# Patient Record
Sex: MALE | Race: BLACK OR AFRICAN AMERICAN | NOT HISPANIC OR LATINO | Employment: OTHER | URBAN - METROPOLITAN AREA
[De-identification: names, ages, dates, MRNs, and addresses within clinical notes are randomized per-mention and may not be internally consistent; named-entity substitution may affect disease eponyms.]

---

## 2017-11-28 ENCOUNTER — HOSPITAL ENCOUNTER (EMERGENCY)
Facility: HOSPITAL | Age: 42
Discharge: HOME | End: 2017-11-28
Attending: EMERGENCY MEDICINE
Payer: OTHER GOVERNMENT

## 2017-11-28 VITALS
HEART RATE: 84 BPM | BODY MASS INDEX: 35.12 KG/M2 | OXYGEN SATURATION: 98 % | RESPIRATION RATE: 20 BRPM | DIASTOLIC BLOOD PRESSURE: 85 MMHG | TEMPERATURE: 100 F | WEIGHT: 265 LBS | HEIGHT: 73 IN | SYSTOLIC BLOOD PRESSURE: 132 MMHG

## 2017-11-28 DIAGNOSIS — R10.9 FLANK PAIN: Primary | ICD-10-CM

## 2017-11-28 DIAGNOSIS — H11.30 SUBCONJUNCTIVAL HEMORRHAGE, UNSPECIFIED LATERALITY: ICD-10-CM

## 2017-11-28 DIAGNOSIS — M54.9 BACK PAIN, UNSPECIFIED BACK LOCATION, UNSPECIFIED BACK PAIN LATERALITY, UNSPECIFIED CHRONICITY: ICD-10-CM

## 2017-11-28 DIAGNOSIS — R10.9 RIGHT FLANK PAIN: ICD-10-CM

## 2017-11-28 LAB
ALBUMIN SERPL BCP-MCNC: 3.7 G/DL
ALP SERPL-CCNC: 77 U/L
ALT SERPL W/O P-5'-P-CCNC: 23 U/L
ANION GAP SERPL CALC-SCNC: 7 MMOL/L
AST SERPL-CCNC: 21 U/L
BASOPHILS # BLD AUTO: 0.02 K/UL
BASOPHILS NFR BLD: 0.3 %
BILIRUB SERPL-MCNC: 0.5 MG/DL
BILIRUB UR QL STRIP: NEGATIVE
BUN SERPL-MCNC: 9 MG/DL
CALCIUM SERPL-MCNC: 8.8 MG/DL
CHLORIDE SERPL-SCNC: 104 MMOL/L
CLARITY UR: CLEAR
CO2 SERPL-SCNC: 28 MMOL/L
COLOR UR: YELLOW
CREAT SERPL-MCNC: 1 MG/DL
DIFFERENTIAL METHOD: ABNORMAL
EOSINOPHIL # BLD AUTO: 0.2 K/UL
EOSINOPHIL NFR BLD: 2.9 %
ERYTHROCYTE [DISTWIDTH] IN BLOOD BY AUTOMATED COUNT: 13.1 %
EST. GFR  (AFRICAN AMERICAN): >60 ML/MIN/1.73 M^2
EST. GFR  (NON AFRICAN AMERICAN): >60 ML/MIN/1.73 M^2
GLUCOSE SERPL-MCNC: 137 MG/DL
GLUCOSE UR QL STRIP: NEGATIVE
HCT VFR BLD AUTO: 40.6 %
HGB BLD-MCNC: 14.5 G/DL
HGB UR QL STRIP: NEGATIVE
KETONES UR QL STRIP: NEGATIVE
LEUKOCYTE ESTERASE UR QL STRIP: NEGATIVE
LIPASE SERPL-CCNC: 47 U/L
LYMPHOCYTES # BLD AUTO: 2.5 K/UL
LYMPHOCYTES NFR BLD: 36.8 %
MCH RBC QN AUTO: 29.5 PG
MCHC RBC AUTO-ENTMCNC: 35.7 G/DL
MCV RBC AUTO: 83 FL
MONOCYTES # BLD AUTO: 0.5 K/UL
MONOCYTES NFR BLD: 6.8 %
NEUTROPHILS # BLD AUTO: 3.7 K/UL
NEUTROPHILS NFR BLD: 53.2 %
NITRITE UR QL STRIP: NEGATIVE
PH UR STRIP: 6 [PH] (ref 5–8)
PLATELET # BLD AUTO: 133 K/UL
PMV BLD AUTO: 10.3 FL
POTASSIUM SERPL-SCNC: 3.7 MMOL/L
PROT SERPL-MCNC: 7.1 G/DL
PROT UR QL STRIP: NEGATIVE
RBC # BLD AUTO: 4.91 M/UL
SODIUM SERPL-SCNC: 139 MMOL/L
SP GR UR STRIP: 1.02 (ref 1–1.03)
URN SPEC COLLECT METH UR: NORMAL
UROBILINOGEN UR STRIP-ACNC: NEGATIVE EU/DL
WBC # BLD AUTO: 6.91 K/UL

## 2017-11-28 PROCEDURE — 96361 HYDRATE IV INFUSION ADD-ON: CPT

## 2017-11-28 PROCEDURE — 80053 COMPREHEN METABOLIC PANEL: CPT

## 2017-11-28 PROCEDURE — 25000003 PHARM REV CODE 250: Performed by: NURSE PRACTITIONER

## 2017-11-28 PROCEDURE — 63600175 PHARM REV CODE 636 W HCPCS: Performed by: NURSE PRACTITIONER

## 2017-11-28 PROCEDURE — 96375 TX/PRO/DX INJ NEW DRUG ADDON: CPT

## 2017-11-28 PROCEDURE — 99284 EMERGENCY DEPT VISIT MOD MDM: CPT | Mod: 25

## 2017-11-28 PROCEDURE — 96374 THER/PROPH/DIAG INJ IV PUSH: CPT

## 2017-11-28 PROCEDURE — 83690 ASSAY OF LIPASE: CPT

## 2017-11-28 PROCEDURE — 85025 COMPLETE CBC W/AUTO DIFF WBC: CPT

## 2017-11-28 PROCEDURE — 81003 URINALYSIS AUTO W/O SCOPE: CPT

## 2017-11-28 RX ORDER — MORPHINE SULFATE 5 MG/ML
10 INJECTION, SOLUTION INTRAMUSCULAR; INTRAVENOUS
Status: COMPLETED | OUTPATIENT
Start: 2017-11-28 | End: 2017-11-28

## 2017-11-28 RX ORDER — KETOROLAC TROMETHAMINE 30 MG/ML
15 INJECTION, SOLUTION INTRAMUSCULAR; INTRAVENOUS
Status: COMPLETED | OUTPATIENT
Start: 2017-11-28 | End: 2017-11-28

## 2017-11-28 RX ORDER — DICYCLOMINE HYDROCHLORIDE 10 MG/1
20 CAPSULE ORAL
Status: COMPLETED | OUTPATIENT
Start: 2017-11-28 | End: 2017-11-28

## 2017-11-28 RX ORDER — METHOCARBAMOL 500 MG/1
1000 TABLET, FILM COATED ORAL 3 TIMES DAILY
Qty: 30 TABLET | Refills: 0 | Status: SHIPPED | OUTPATIENT
Start: 2017-11-28 | End: 2017-12-03

## 2017-11-28 RX ADMIN — KETOROLAC TROMETHAMINE 15 MG: 30 INJECTION, SOLUTION INTRAMUSCULAR at 02:11

## 2017-11-28 RX ADMIN — SODIUM CHLORIDE 1000 ML: 0.9 INJECTION, SOLUTION INTRAVENOUS at 02:11

## 2017-11-28 RX ADMIN — MORPHINE SULFATE 10 MG: 5 INJECTION, SOLUTION INTRAMUSCULAR; INTRAVENOUS at 03:11

## 2017-11-28 RX ADMIN — DICYCLOMINE HYDROCHLORIDE 20 MG: 10 CAPSULE ORAL at 03:11

## 2017-11-28 NOTE — ED PROVIDER NOTES
"Encounter Date: 11/28/2017    SCRIBE #1 NOTE: I, Harriet Anguiano, am scribing for, and in the presence of,  Ana Williamson NP. I have scribed the following portions of the note - Other sections scribed: ROS and HPI.       History     Chief Complaint   Patient presents with    Back Pain     " nausea and back pain" symptoms since x 4 days ago      CC: Back Pain    HPI: This 42 y.o. male with a past medical history of Hyperlipidemia and Lateral epicondylitis  of elbow (5/15/2013), presents to the ED complaining of a right sided flank pain for last 5 days. Pain is severe (9/10) and constant. No exacerbating or alleviating factors are reported. Patient was diagnosed with a possible Nephrolithiasis 4 days ago after seen for his compliant at another hospital. He reports associated N/V and soft BM for last 2 days. Patient has been taking prescribed Vicodin and Ciprofloxacin with some relief for 2 days but pain has progressively gotten worse this morning. He denies any right sided groin pain, abdominal pain, diarrhea or any urinary sx. He also reports waking up with a blood shot left eye this morning.      The history is provided by the patient. No  was used.     Review of patient's allergies indicates:  No Known Allergies  Past Medical History:   Diagnosis Date    Hyperlipidemia     Lateral epicondylitis  of elbow 5/15/2013     Past Surgical History:   Procedure Laterality Date    KNEE ARTHROSCOPY       Family History   Problem Relation Age of Onset    Hypertension Mother     Diabetes Mother     Hypertension Father     Glaucoma Maternal Grandmother     Cataracts Maternal Grandmother     Glaucoma Maternal Grandfather     Cataracts Maternal Grandfather     Glaucoma Paternal Grandmother     Cataracts Paternal Grandmother     Glaucoma Paternal Grandfather     Cataracts Paternal Grandfather     Amblyopia Neg Hx     Blindness Neg Hx     Cancer Neg Hx     Macular degeneration Neg Hx     Retinal " detachment Neg Hx     Strabismus Neg Hx     Stroke Neg Hx     Thyroid disease Neg Hx      Social History   Substance Use Topics    Smoking status: Never Smoker    Smokeless tobacco: Never Used    Alcohol use Yes      Comment: ETOH-occasional     Review of Systems   Constitutional: Negative for appetite change, chills and fever.   HENT: Negative for rhinorrhea and sore throat.    Eyes: Positive for redness (left). Negative for photophobia, pain, discharge, itching and visual disturbance.   Respiratory: Negative for cough.    Cardiovascular: Negative for chest pain.   Gastrointestinal: Positive for nausea and vomiting. Negative for abdominal pain, blood in stool, constipation and diarrhea.        (+) soft stool   Genitourinary: Positive for flank pain (right). Negative for dysuria.   Musculoskeletal: Negative for back pain.   Skin: Negative for color change.   Neurological: Negative for syncope and weakness.   Psychiatric/Behavioral: The patient is not nervous/anxious.        Physical Exam     Initial Vitals [11/28/17 1130]   BP Pulse Resp Temp SpO2   133/70 81 18 98.4 °F (36.9 °C) 98 %      MAP       91         Physical Exam    Nursing note and vitals reviewed.  Constitutional: He appears well-developed and well-nourished.   HENT:   Head: Normocephalic.   Mouth/Throat: Oropharynx is clear and moist.   Eyes: Conjunctivae are normal.   Left conjunctiva with hemorrhage present.   Neck: Normal range of motion. Neck supple.   Cardiovascular: Normal rate, regular rhythm and normal heart sounds.   Pulmonary/Chest: Breath sounds normal.   Abdominal: Soft. Bowel sounds are normal. He exhibits no distension and no mass. There is no tenderness. There is no rebound and no guarding.   Musculoskeletal: Normal range of motion. He exhibits tenderness.   There is tenderness over the right mid back.   Neurological: He is alert and oriented to person, place, and time.   Skin: Skin is warm and dry. Capillary refill takes less than  2 seconds.   Psychiatric: He has a normal mood and affect.         ED Course   Procedures  Labs Reviewed   CBC W/ AUTO DIFFERENTIAL - Abnormal; Notable for the following:        Result Value    Platelets 133 (*)     All other components within normal limits   COMPREHENSIVE METABOLIC PANEL - Abnormal; Notable for the following:     Glucose 137 (*)     Anion Gap 7 (*)     All other components within normal limits   URINALYSIS   LIPASE             Medical Decision Making:   Initial Assessment:   42-year-old male presents with right flank pain ×5 days  Differential Diagnosis:   Gastroenteritis  Renal colic  UTI  ED Management:  Diagnosis management comments: This is an urgent evaluation of a 42-year-old male  that presented to the ER with c/o right flank pain.  Pts exam was as above.     Labs and CT were reviewed and discussed with pt. patient states he has previously been diagnosed as a diabetic however he has been off the medication since losing weight.  He will follow back up with his primary for reassessment of diabetes.    Based on exam today - I have low suspicion for medical, surgical or other life threatening condition.  I believe that by ruling out any abdominal pathology, that this is most likely muscle pain in the right mid back.  I will add Robaxin to patient's medications to treat for muscle spasm. I believe pt is safe for discharge and outpatient f/u.    Pt verbalizes understanding of d/c instructions and will return for worsening condition.    Case discussed with attending who agrees with assessment and plan.               Scribe Attestation:   Scribe #1: I performed the above scribed service and the documentation accurately describes the services I performed. I attest to the accuracy of the note.    Attending Attestation:           Physician Attestation for Scribe:  Physician Attestation Statement for Scribe #1: I, Ana Williamson NP, reviewed documentation, as scribed by Harriet Anguiano in my presence, and  it is both accurate and complete.                 ED Course      Clinical Impression:   The primary encounter diagnosis was Flank pain. Diagnoses of Right flank pain, Back pain, unspecified back location, unspecified back pain laterality, unspecified chronicity, and Subconjunctival hemorrhage, unspecified laterality were also pertinent to this visit.    Disposition:   Disposition: Discharged  Condition: Stable                        Ana Williamson NP  11/28/17 2431

## 2017-11-28 NOTE — ED TRIAGE NOTES
Pt presents to ED with c/o Right sided flank pain since thursday. Pt also reports N/V and soft stool. Pt also c/o of having a blood shot left eye. Reports being told that he had kidney stones on Thursday. Reports mom was admitted to hospital with bacterial infection. Will continue to monitor.

## 2018-07-09 ENCOUNTER — HOSPITAL ENCOUNTER (EMERGENCY)
Facility: HOSPITAL | Age: 43
Discharge: HOME | End: 2018-07-09
Attending: EMERGENCY MEDICINE
Payer: OTHER GOVERNMENT

## 2018-07-09 VITALS
HEIGHT: 73 IN | WEIGHT: 270 LBS | SYSTOLIC BLOOD PRESSURE: 128 MMHG | HEART RATE: 91 BPM | RESPIRATION RATE: 16 BRPM | TEMPERATURE: 98 F | BODY MASS INDEX: 35.78 KG/M2 | DIASTOLIC BLOOD PRESSURE: 81 MMHG | OXYGEN SATURATION: 96 %

## 2018-07-09 DIAGNOSIS — R52 PAIN: ICD-10-CM

## 2018-07-09 DIAGNOSIS — S62.111B: Primary | ICD-10-CM

## 2018-07-09 DIAGNOSIS — S93.401A SPRAIN OF RIGHT ANKLE, UNSPECIFIED LIGAMENT, INITIAL ENCOUNTER: ICD-10-CM

## 2018-07-09 PROCEDURE — 99283 EMERGENCY DEPT VISIT LOW MDM: CPT | Mod: 25

## 2018-07-09 PROCEDURE — 96372 THER/PROPH/DIAG INJ SC/IM: CPT

## 2018-07-09 PROCEDURE — 63600175 PHARM REV CODE 636 W HCPCS: Performed by: PHYSICIAN ASSISTANT

## 2018-07-09 RX ORDER — METFORMIN HYDROCHLORIDE EXTENDED-RELEASE TABLETS 500 MG/1
500 TABLET, FILM COATED, EXTENDED RELEASE ORAL
COMMUNITY

## 2018-07-09 RX ORDER — KETOROLAC TROMETHAMINE 30 MG/ML
15 INJECTION, SOLUTION INTRAMUSCULAR; INTRAVENOUS
Status: COMPLETED | OUTPATIENT
Start: 2018-07-09 | End: 2018-07-09

## 2018-07-09 RX ORDER — IBUPROFEN 600 MG/1
600 TABLET ORAL EVERY 6 HOURS PRN
Qty: 20 TABLET | Refills: 0 | Status: SHIPPED | OUTPATIENT
Start: 2018-07-09 | End: 2018-07-09 | Stop reason: CLARIF

## 2018-07-09 RX ADMIN — KETOROLAC TROMETHAMINE 15 MG: 30 INJECTION, SOLUTION INTRAMUSCULAR; INTRAVENOUS at 07:07

## 2018-07-10 NOTE — ED TRIAGE NOTES
Patient presents to the ED via personal vehicle alone reports that he injured his right wrist and ankle during a fall about 3 weeks ago. Patient reports that despite interventions, ibuprofen and ice, the pain has not subsided.

## 2018-07-10 NOTE — ED PROVIDER NOTES
"Encounter Date: 7/9/2018       History     Chief Complaint   Patient presents with    Wrist Pain     x2 weeks, rolled right ankle stepping in pothole and caught himself from falling with right wrist; reports hearing a "pop" sound and reports palmar pain and pain when moving his right thumb; reports being seen at an urgent care who told him he needs an MRI on the wrist     This is a 43-year-old male complains of right wrist pain and right ankle pain x2 weeks after mechanical fall.  He reports twisting his ankle in a pothole and falling on an outstretched right hand.  He has able to ambulate with minimal limp on the right ankle.  He is able to the use full range of motion of the right wrist and hand, however, reports popping sensation and a locking sensation in his right thumb.  He denies numbness, skin wounds, or other injury. Patient has been seen at Urgent Care, and reports he was told to get an MRI.          Review of patient's allergies indicates:  No Known Allergies  Past Medical History:   Diagnosis Date    Diabetes mellitus     Hyperlipidemia     Lateral epicondylitis  of elbow 5/15/2013     Past Surgical History:   Procedure Laterality Date    BREAST SURGERY      gynecomastia surgery    KNEE ARTHROSCOPY       Family History   Problem Relation Age of Onset    Hypertension Mother     Diabetes Mother     Hypertension Father     Glaucoma Maternal Grandmother     Cataracts Maternal Grandmother     Glaucoma Maternal Grandfather     Cataracts Maternal Grandfather     Glaucoma Paternal Grandmother     Cataracts Paternal Grandmother     Glaucoma Paternal Grandfather     Cataracts Paternal Grandfather     Amblyopia Neg Hx     Blindness Neg Hx     Cancer Neg Hx     Macular degeneration Neg Hx     Retinal detachment Neg Hx     Strabismus Neg Hx     Stroke Neg Hx     Thyroid disease Neg Hx      Social History   Substance Use Topics    Smoking status: Never Smoker    Smokeless tobacco: Never Used "    Alcohol use Yes      Comment: ETOH-occasional     Review of Systems   Constitutional: Negative for fever.   HENT: Negative for sore throat.    Respiratory: Negative for shortness of breath.    Cardiovascular: Negative for chest pain.   Gastrointestinal: Negative for nausea.   Genitourinary: Negative for dysuria.   Musculoskeletal: Positive for arthralgias. Negative for back pain.   Skin: Negative for rash.   Neurological: Negative for weakness.   Hematological: Does not bruise/bleed easily.       Physical Exam     Initial Vitals [07/09/18 1947]   BP Pulse Resp Temp SpO2   (!) 146/97 97 16 98 °F (36.7 °C) 98 %      MAP       --         Physical Exam    Vitals reviewed.  Constitutional: He appears well-developed and well-nourished. He is not diaphoretic. No distress.   HENT:   Head: Normocephalic and atraumatic.   Right Ear: External ear normal.   Left Ear: External ear normal.   Nose: Nose normal.   Eyes: Conjunctivae are normal. No scleral icterus.   Neck: Normal range of motion. Neck supple.   Cardiovascular: Normal rate, regular rhythm and intact distal pulses.   Pulmonary/Chest: No respiratory distress.   Musculoskeletal: Normal range of motion. He exhibits tenderness. He exhibits no edema.   Mild tenderness to the distal radius and ulna without deformity or edema.  Full range of motion. Mild tenderness to the hypothenar palm.  Left thumb with full range of motion without crepitus or deformity.  No tenderness or edema.  Left ankle with no tenderness to the medial or lateral malleolus.  No tenderness to the 5th metatarsal or navicular.  Patient ambulates with minimal limp.   Neurological: He is alert and oriented to person, place, and time. He has normal strength. No sensory deficit.   Skin: Skin is warm and dry. No rash noted. No erythema.         ED Course   Procedures  Labs Reviewed - No data to display       Imaging Results          X-Ray Wrist Complete Right (Final result)  Result time 07/09/18 20:20:53     Final result by Corwin Pond MD (07/09/18 20:20:53)                 Impression:      Avulsion fracture of the triquetrum.      Electronically signed by: Corwin Pond MD  Date:    07/09/2018  Time:    20:20             Narrative:    EXAMINATION:  XR WRIST COMPLETE 3 VIEWS RIGHT    CLINICAL HISTORY:  Pain, unspecified    TECHNIQUE:  PA, lateral, and oblique views of the right wrist were performed.    COMPARISON:  None    FINDINGS:  There is an avulsion fracture of the dorsal triquetrum.  Associated soft tissue swelling noted.  Remaining carpal bones are intact.  Cartilage spaces are maintained.  Bone island seen in the distal ulna.                              X-Rays:   Independently Interpreted Readings:   Other Readings:  X-ray of the right wrist shows avulsion fracture of the dorsal triquetrum    Medical Decision Making:   ED Management:  Patient with right wrist pain and right ankle pain for mechanical fall.  Right wrist with tenderness to the ulnar aspect and hypothenar palm.  No deformity or significant swelling.  The hand is neurovascularly intact. No evidence of compartment syndrome.  No evidence of infection.  X-ray shows avulsion fracture of the triquetrum.  Patient placed in volar wrist splint.  Right ankle likely with mild sprain.  No indication for x-ray by Jonancy ankle rules.  Will have patient follow up with Orthopedics for re-evaluation of triquetrum fracture.  Patient encouraged to elevate and rest the wrist and ankle, and continue taking NSAIDs for pain.  He verbalized understanding and agreed with plan.                      Clinical Impression:   The primary encounter diagnosis was Triquetral chip fracture, right, open, initial encounter. Diagnoses of Pain and Sprain of right ankle, unspecified ligament, initial encounter were also pertinent to this visit.                             Marcelo Aranda, SHELL  07/09/18 2041

## 2018-07-17 ENCOUNTER — TELEPHONE (OUTPATIENT)
Dept: ORTHOPEDICS | Facility: CLINIC | Age: 43
End: 2018-07-17

## 2018-07-17 ENCOUNTER — TELEPHONE (OUTPATIENT)
Dept: PODIATRY | Facility: CLINIC | Age: 43
End: 2018-07-17

## 2018-07-17 NOTE — TELEPHONE ENCOUNTER
----- Message from Lyla Benjamin sent at 7/17/2018 12:44 PM CDT -----  Contact: Abby Collier Calling from [facility] [phone] or [phone]  Calling to talk to nurse concerning to see if the patient can be seen this week for a hand fracture and open wound on his right hand. Please advice

## 2018-07-17 NOTE — TELEPHONE ENCOUNTER
----- Message from Jayshree Medrano sent at 7/17/2018  2:08 PM CDT -----  Contact: Meera(wife)/ [phone]  Patient's wife called in to ask if patient can be seen at 1:30 pm on July 19. Patient is a  and might not make it at 1 pm.    Please call and advise.

## 2018-07-17 NOTE — TELEPHONE ENCOUNTER
----- Message from Lyla Benjamin sent at 7/17/2018 12:48 PM CDT -----  Contact: Alfonso Collier Calling from [employer] [phone] or [phone]  Calling to see if the patient can be seen this week for a sprain of right ankle. Please advice

## 2018-07-17 NOTE — TELEPHONE ENCOUNTER
I spoke with mrs. gomez and made the patient a new patient appointment. She was made aware of date, time and location. She also was given the fax number to fax over the referral.

## 2018-07-23 ENCOUNTER — OFFICE VISIT (OUTPATIENT)
Dept: ORTHOPEDICS | Facility: CLINIC | Age: 43
End: 2018-07-23
Payer: OTHER GOVERNMENT

## 2018-07-23 VITALS — HEIGHT: 73 IN | BODY MASS INDEX: 35.78 KG/M2 | WEIGHT: 270 LBS

## 2018-07-23 DIAGNOSIS — M25.531 RIGHT WRIST PAIN: Primary | ICD-10-CM

## 2018-07-23 PROCEDURE — 99213 OFFICE O/P EST LOW 20 MIN: CPT | Mod: PBBFAC,PO | Performed by: ORTHOPAEDIC SURGERY

## 2018-07-23 PROCEDURE — 99999 PR PBB SHADOW E&M-EST. PATIENT-LVL III: CPT | Mod: PBBFAC,,, | Performed by: ORTHOPAEDIC SURGERY

## 2018-07-23 PROCEDURE — 99203 OFFICE O/P NEW LOW 30 MIN: CPT | Mod: S$PBB,,, | Performed by: ORTHOPAEDIC SURGERY

## 2018-07-23 NOTE — LETTER
July 23, 2018        RAQUEL Grace MD  [address]  [place] [address]             [facility]  [address] [place] [address] [address]  [facility] [address]  Phone: [phone]   Patient: Saul Bains Jr.   MR Number: [id number]   YOB: 1975   Date of Visit: 7/23/2018       Dear Dr. Grace:    Thank you for referring Saul Bains to me for evaluation. Below are the relevant portions of my assessment and plan of care.            If you have questions, please do not hesitate to call me. I look forward to following Saul along with you.    Sincerely,      Ricky Grady Jr., MD           CC  No Recipients

## 2018-07-23 NOTE — PROGRESS NOTES
INITIAL VISIT    HISTORY:  A 43-year-old male presents for evaluation of right hand and wrist   injury, which he sustained about a month ago when he slipped at a friend's   house, fell on a rock with his right hand and wrist.  Treated himself for a few   weeks, but then had ongoing pain, which persisted, had an x-ray done on July 9   at Urgent Care, which showed an avulsion fracture of the triquetrum of the   dorsal wrist, consistent with a capsular avulsion sprain of the right wrist.    Since then, he has been using a wrist brace from time to time, but he reports   that his symptoms have persisted.  No numbness or tingling is reported, although   he does have a history of carpal tunnel syndrome in the medical records.    He is complaining of pain in the right hand and wrist, mostly volarly, but   occasionally dorsally, has some limitation of motion secondary to pain, and he   is concerned that he cannot put full weight on the wrist since this injury   occurred.    PAST MEDICAL HISTORY:  Significant for diabetes, hyperlipidemia and tennis   elbow.    PAST SURGICAL HISTORY:  Includes knee arthroscopy, breast surgery.    FAMILY HISTORY:  Positive for hypertension, cataracts and glaucoma.    SOCIAL HISTORY:  The patient does not smoke.  Drinks alcohol occasionally.    REVIEW OF SYSTEMS:  Negative fever, chills, rashes.    CURRENT MEDICATIONS:  Reviewed on chart.    ALLERGIES:  None.    PHYSICAL EXAMINATION:  GENERAL:  Well-developed, well-nourished male in no acute distress, alert and   oriented x3.  MUSCULOSKELETAL:  Examination of the upper extremities significant for right   hand and wrist demonstrating some mild tenderness over the dorsum of the right   wrist.  Slight swelling is noted.  Range of motion in wrist is slightly   decreased secondary to pain and stiffness.  Range of motion of finger is full.     strength decreased, sensation intact.  Tinel sign negative over the median   nerve at the  wrist.    X-rays reviewed, AP and lateral, right wrist, demonstrates avulsion fracture of   the dorsal triquetrum.  No other abnormalities noted.    IMPRESSION:  Capsular avulsion fracture, right wrist (sprain).    PLAN:  I explained the nature of the problem to the patient.  He is anxious to   get things evaluated completely, so he has asked about an MRI for the right   wrist.    I think the MRI would show if he has any ligament damage, but we already know he   has a capsular avulsion injury, and I explained that to him today.  We will go   ahead and set up the MRI to check the right wrist.  In the meantime, I have   given him a wrist brace to protect the wrist, and I explained to him that even   with the MRI, he would still take another month or so to get better in terms of   full strength and full use.    Continue anti-inflammatory medication by mouth.  Follow up after the MRI is   complete.      JULIA  dd: 07/23/2018 16:34:37 (CDT)  td: 07/24/2018 01:49:56 (SKINNY)  Doc ID   #[id number]  Job ID #[id number]    CC:

## 2018-08-16 ENCOUNTER — HOSPITAL ENCOUNTER (OUTPATIENT)
Dept: RADIOLOGY | Facility: HOSPITAL | Age: 43
Discharge: HOME | End: 2018-08-16
Attending: PODIATRIST
Payer: OTHER GOVERNMENT

## 2018-08-16 ENCOUNTER — OFFICE VISIT (OUTPATIENT)
Dept: PODIATRY | Facility: CLINIC | Age: 43
End: 2018-08-16
Payer: OTHER GOVERNMENT

## 2018-08-16 VITALS
DIASTOLIC BLOOD PRESSURE: 89 MMHG | HEIGHT: 73 IN | HEART RATE: 94 BPM | WEIGHT: 270 LBS | SYSTOLIC BLOOD PRESSURE: 138 MMHG | BODY MASS INDEX: 35.78 KG/M2

## 2018-08-16 DIAGNOSIS — M76.71 PERONEAL TENDONITIS, RIGHT: ICD-10-CM

## 2018-08-16 DIAGNOSIS — M79.672 BILATERAL FOOT PAIN: ICD-10-CM

## 2018-08-16 DIAGNOSIS — M79.671 BILATERAL FOOT PAIN: ICD-10-CM

## 2018-08-16 DIAGNOSIS — M62.469 GASTROCNEMIUS EQUINUS, UNSPECIFIED LATERALITY: ICD-10-CM

## 2018-08-16 PROCEDURE — 73630 X-RAY EXAM OF FOOT: CPT | Mod: 50,TC,FY,PO

## 2018-08-16 PROCEDURE — 99214 OFFICE O/P EST MOD 30 MIN: CPT | Mod: PBBFAC,25,PO | Performed by: PODIATRIST

## 2018-08-16 PROCEDURE — 73610 X-RAY EXAM OF ANKLE: CPT | Mod: TC,FY,PO,RT

## 2018-08-16 PROCEDURE — 99999 PR PBB SHADOW E&M-EST. PATIENT-LVL IV: CPT | Mod: PBBFAC,,, | Performed by: PODIATRIST

## 2018-08-16 PROCEDURE — 73630 X-RAY EXAM OF FOOT: CPT | Mod: 26,50,, | Performed by: RADIOLOGY

## 2018-08-16 PROCEDURE — 73610 X-RAY EXAM OF ANKLE: CPT | Mod: 26,RT,, | Performed by: RADIOLOGY

## 2018-08-16 PROCEDURE — 99203 OFFICE O/P NEW LOW 30 MIN: CPT | Mod: S$PBB,,, | Performed by: PODIATRIST

## 2018-08-16 RX ORDER — MELOXICAM 15 MG/1
15 TABLET ORAL DAILY
Qty: 30 TABLET | Refills: 1 | OUTPATIENT
Start: 2018-08-16 | End: 2024-02-17

## 2018-08-16 NOTE — PROGRESS NOTES
"Subjective:      Patient ID: Saul Bains Jr. is a 43 y.o. male.    Chief Complaint: Ankle Pain (Right) and Diabetes Mellitus    Saul is a 43 y.o. male  has a past medical history of Diabetes mellitus, Hyperlipidemia, and Lateral epicondylitis  of elbow. Non smoker who presents to the podiatry clinic  with complaint of  bilateral foot pain. Onset of the symptoms was pt states he fell on his left ankle a month ago. Also complains of swelling for many months on R foot. . Precipitating event: inversion injury to foot. Current symptoms include: ability to bear weight, but with some pain and swelling. Aggravating factors: any weight bearing. Symptoms have stabilized. Patient has had no prior foot problems. Evaluation to date: none. Treatment to date: ice and OTC analgesics which are somewhat effective. Patients rates pain 4/10 on pain scale, but can increase to 10/10.     Vitals:    08/16/18 1444   BP: 138/89   Pulse: 94   Weight: 122.5 kg (270 lb)   Height: 6' 1" (1.854 m)   PainSc: 0-No pain      Past Medical History:   Diagnosis Date    Diabetes mellitus     Hyperlipidemia     Lateral epicondylitis  of elbow 5/15/2013       Past Surgical History:   Procedure Laterality Date    BREAST SURGERY      gynecomastia surgery    KNEE ARTHROSCOPY         Family History   Problem Relation Age of Onset    Hypertension Mother     Diabetes Mother     Hypertension Father     Glaucoma Maternal Grandmother     Cataracts Maternal Grandmother     Glaucoma Maternal Grandfather     Cataracts Maternal Grandfather     Glaucoma Paternal Grandmother     Cataracts Paternal Grandmother     Glaucoma Paternal Grandfather     Cataracts Paternal Grandfather     Amblyopia Neg Hx     Blindness Neg Hx     Cancer Neg Hx     Macular degeneration Neg Hx     Retinal detachment Neg Hx     Strabismus Neg Hx     Stroke Neg Hx     Thyroid disease Neg Hx        Social History     Socioeconomic History    Marital status:  "     Spouse name: None    Number of children: None    Years of education: None    Highest education level: None   Social Needs    Financial resource strain: None    Food insecurity - worry: None    Food insecurity - inability: None    Transportation needs - medical: None    Transportation needs - non-medical: None   Occupational History    None   Tobacco Use    Smoking status: Never Smoker    Smokeless tobacco: Never Used   Substance and Sexual Activity    Alcohol use: Yes     Comment: ETOH-occasional    Drug use: No    Sexual activity: None   Other Topics Concern    None   Social History Narrative    None       Current Outpatient Medications   Medication Sig Dispense Refill    cyclobenzaprine (FLEXERIL) 10 MG tablet Take 1 tablet (10 mg total) by mouth every evening. 30 tablet 2    fluoxetine (PROZAC) 40 MG capsule Take 1 capsule (40 mg total) by mouth once daily. 30 capsule 3    gabapentin (NEURONTIN) 300 MG capsule Take 1 capsule (300 mg total) by mouth 3 (three) times daily. 90 capsule 0    ibuprofen (ADVIL,MOTRIN) 800 MG tablet Take 1 tablet (800 mg total) by mouth 3 (three) times daily as needed (pain). 90 tablet 2    metFORMIN (FORTAMET) 500 mg 24 hr tablet Take 500 mg by mouth daily with breakfast.      nystatin (MYCOSTATIN) cream 1 Cream Topical Twice a day      omega-3 fatty acids-vitamin E (FISH OIL) 1,000 mg Cap Take by mouth. 1 Capsule Oral Three times a day.  With meals      ondansetron (ZOFRAN-ODT) 4 MG TbDL Take 2 tablets (8 mg total) by mouth every 8 (eight) hours as needed. 12 tablet 1    oxycodone-acetaminophen  mg (PERCOCET)  mg per tablet Take 1 tablet by mouth every 4 (four) hours as needed for Pain. 20 tablet 0    zolpidem 12.5 MG ORAL CR TBMP (AMBIEN CR) 12.5 MG CR tablet Take 1 tablet (12.5 mg total) by mouth nightly as needed. 30 tablet 0    meloxicam (MOBIC) 15 MG tablet Take 1 tablet (15 mg total) by mouth once daily. 30 tablet 1    tamsulosin  (FLOMAX) 0.4 mg Cp24 Take 1 capsule (0.4 mg total) by mouth once daily. 30 capsule 11     No current facility-administered medications for this visit.        Review of patient's allergies indicates:  No Known Allergies      Review of Systems   Constitution: Negative for decreased appetite, fever and night sweats.   HENT: Negative for congestion.    Cardiovascular: Negative for chest pain and claudication.   Respiratory: Negative for shortness of breath.    Skin: Negative for nail changes.   Musculoskeletal: Negative for back pain and muscle weakness.   Gastrointestinal: Negative for nausea and vomiting.   Neurological: Negative for numbness and paresthesias.   Psychiatric/Behavioral: Negative for altered mental status.           Objective:      Physical Exam   Constitutional: He is oriented to person, place, and time. He appears well-developed and well-nourished. No distress.   Cardiovascular: Intact distal pulses.   Pulses:       Dorsalis pedis pulses are 2+ on the right side, and 2+ on the left side.        Posterior tibial pulses are 2+ on the right side, and 2+ on the left side.   CFT< 3 secs all toes bilateral foot, skin temp warm bilateral foot, diminished digital hair growth bilateral foot, no lower extremity edema bilateral.     Musculoskeletal: Normal range of motion. He exhibits tenderness and deformity. He exhibits no edema.        Feet:    Feet:   Right Foot:   Protective Sensation: 10 sites tested. 10 sites sensed.   Skin Integrity: Positive for dry skin.   Left Foot:   Protective Sensation: 10 sites tested. 10 sites sensed.   Skin Integrity: Positive for dry skin.   Neurological: He is alert and oriented to person, place, and time. No sensory deficit.   Skin: Skin is warm, dry and intact. Capillary refill takes less than 2 seconds. No ecchymosis and no rash noted. He is not diaphoretic. No cyanosis or erythema. No pallor. Nails show no clubbing.             Assessment:       Encounter Diagnoses   Name  Primary?    First degree ankle sprain, right, initial encounter Yes    Peroneal tendonitis, right     Gastrocnemius equinus, unspecified laterality     Bilateral foot pain          Plan:       Saul was seen today for ankle pain and diabetes mellitus.    Diagnoses and all orders for this visit:    First degree ankle sprain, right, initial encounter  -     X-Ray Ankle Complete Right; Future  -     X-Ray Foot Complete 3 view Bilateral; Future  -     Ambulatory Referral to Physical/Occupational Therapy    Peroneal tendonitis, right  -     X-Ray Ankle Complete Right; Future  -     X-Ray Foot Complete 3 view Bilateral; Future  -     Ambulatory Referral to Physical/Occupational Therapy    Gastrocnemius equinus, unspecified laterality  -     Ambulatory Referral to Physical/Occupational Therapy    Bilateral foot pain  -     X-Ray Ankle Complete Right; Future  -     X-Ray Foot Complete 3 view Bilateral; Future  -     Ambulatory Referral to Physical/Occupational Therapy    Other orders  -     meloxicam (MOBIC) 15 MG tablet; Take 1 tablet (15 mg total) by mouth once daily.      I counseled the patient on his conditions, their implications and medical management.  Pt instructed on importance of rigid shoes with wide toe box and arch supports and small heel to support foot type and decrease pressure.  Educated on importance of RICE therapy  PT for equinus and peroneal tendonitis  RTC as specified      Isa Garg, PGY3    I have personally taken the history and examined this patient and agree with the resident's note as stated as above.   Kan TELLOM, FACFAS    Recommended Yun Beast tennis shoes

## 2018-08-20 ENCOUNTER — PATIENT MESSAGE (OUTPATIENT)
Dept: PODIATRY | Facility: CLINIC | Age: 43
End: 2018-08-20

## 2018-10-16 ENCOUNTER — TELEPHONE (OUTPATIENT)
Dept: ORTHOPEDICS | Facility: CLINIC | Age: 43
End: 2018-10-16

## 2018-10-16 DIAGNOSIS — M25.531 RIGHT WRIST PAIN: Primary | ICD-10-CM

## 2018-10-16 NOTE — TELEPHONE ENCOUNTER
----- Message from Ailyn Montes De Oca sent at 10/16/2018  1:38 PM CDT -----  Contact: self / [phone]  Patient is requesting a call back regarding, he needs orders for an MRI for his hand & wrist on right.  Please advise

## 2018-11-21 ENCOUNTER — HOSPITAL ENCOUNTER (OUTPATIENT)
Dept: RADIOLOGY | Facility: HOSPITAL | Age: 43
Discharge: HOME | End: 2018-11-21
Attending: ORTHOPAEDIC SURGERY
Payer: OTHER GOVERNMENT

## 2018-11-21 DIAGNOSIS — M25.531 RIGHT WRIST PAIN: ICD-10-CM

## 2018-11-21 PROCEDURE — 73221 MRI JOINT UPR EXTREM W/O DYE: CPT | Mod: 26,RT,, | Performed by: RADIOLOGY

## 2018-11-21 PROCEDURE — 73221 MRI JOINT UPR EXTREM W/O DYE: CPT | Mod: TC,RT

## 2020-02-01 ENCOUNTER — HOSPITAL ENCOUNTER (EMERGENCY)
Facility: HOSPITAL | Age: 45
Discharge: HOME | End: 2020-02-01
Attending: EMERGENCY MEDICINE
Payer: OTHER GOVERNMENT

## 2020-02-01 VITALS
OXYGEN SATURATION: 98 % | RESPIRATION RATE: 18 BRPM | TEMPERATURE: 99 F | SYSTOLIC BLOOD PRESSURE: 132 MMHG | BODY MASS INDEX: 35.78 KG/M2 | DIASTOLIC BLOOD PRESSURE: 77 MMHG | HEIGHT: 73 IN | WEIGHT: 270 LBS | HEART RATE: 76 BPM

## 2020-02-01 DIAGNOSIS — N21.0 URINARY BLADDER STONE: Primary | ICD-10-CM

## 2020-02-01 LAB
ALBUMIN SERPL BCP-MCNC: 3.9 G/DL (ref 3.5–5.2)
ALP SERPL-CCNC: 68 U/L (ref 55–135)
ALT SERPL W/O P-5'-P-CCNC: 25 U/L (ref 10–44)
ANION GAP SERPL CALC-SCNC: 6 MMOL/L (ref 8–16)
AST SERPL-CCNC: 20 U/L (ref 10–40)
BACTERIA #/AREA URNS HPF: ABNORMAL /HPF
BASOPHILS # BLD AUTO: 0.03 K/UL (ref 0–0.2)
BASOPHILS NFR BLD: 0.6 % (ref 0–1.9)
BILIRUB SERPL-MCNC: 0.7 MG/DL (ref 0.1–1)
BILIRUB UR QL STRIP: NEGATIVE
BUN SERPL-MCNC: 14 MG/DL (ref 6–20)
CALCIUM SERPL-MCNC: 9.1 MG/DL (ref 8.7–10.5)
CHLORIDE SERPL-SCNC: 105 MMOL/L (ref 95–110)
CLARITY UR: CLEAR
CO2 SERPL-SCNC: 29 MMOL/L (ref 23–29)
COLOR UR: YELLOW
CREAT SERPL-MCNC: 1.2 MG/DL (ref 0.5–1.4)
DIFFERENTIAL METHOD: ABNORMAL
EOSINOPHIL # BLD AUTO: 0.2 K/UL (ref 0–0.5)
EOSINOPHIL NFR BLD: 3.1 % (ref 0–8)
ERYTHROCYTE [DISTWIDTH] IN BLOOD BY AUTOMATED COUNT: 12.9 % (ref 11.5–14.5)
EST. GFR  (AFRICAN AMERICAN): >60 ML/MIN/1.73 M^2
EST. GFR  (NON AFRICAN AMERICAN): >60 ML/MIN/1.73 M^2
GLUCOSE SERPL-MCNC: 221 MG/DL (ref 70–110)
GLUCOSE UR QL STRIP: ABNORMAL
HCT VFR BLD AUTO: 42.8 % (ref 40–54)
HGB BLD-MCNC: 14.6 G/DL (ref 14–18)
HGB UR QL STRIP: ABNORMAL
IMM GRANULOCYTES # BLD AUTO: 0.03 K/UL (ref 0–0.04)
IMM GRANULOCYTES NFR BLD AUTO: 0.6 % (ref 0–0.5)
KETONES UR QL STRIP: NEGATIVE
LEUKOCYTE ESTERASE UR QL STRIP: NEGATIVE
LYMPHOCYTES # BLD AUTO: 2.4 K/UL (ref 1–4.8)
LYMPHOCYTES NFR BLD: 46.1 % (ref 18–48)
MCH RBC QN AUTO: 28.7 PG (ref 27–31)
MCHC RBC AUTO-ENTMCNC: 34.1 G/DL (ref 32–36)
MCV RBC AUTO: 84 FL (ref 82–98)
MICROSCOPIC COMMENT: ABNORMAL
MONOCYTES # BLD AUTO: 0.4 K/UL (ref 0.3–1)
MONOCYTES NFR BLD: 7 % (ref 4–15)
NEUTROPHILS # BLD AUTO: 2.2 K/UL (ref 1.8–7.7)
NEUTROPHILS NFR BLD: 42.6 % (ref 38–73)
NITRITE UR QL STRIP: NEGATIVE
NRBC BLD-RTO: 0 /100 WBC
PH UR STRIP: 5 [PH] (ref 5–8)
PLATELET # BLD AUTO: 136 K/UL (ref 150–350)
PMV BLD AUTO: 10.4 FL (ref 9.2–12.9)
POCT GLUCOSE: 184 MG/DL (ref 70–110)
POTASSIUM SERPL-SCNC: 3.9 MMOL/L (ref 3.5–5.1)
PROT SERPL-MCNC: 7 G/DL (ref 6–8.4)
PROT UR QL STRIP: NEGATIVE
RBC # BLD AUTO: 5.09 M/UL (ref 4.6–6.2)
RBC #/AREA URNS HPF: 8 /HPF (ref 0–4)
SODIUM SERPL-SCNC: 140 MMOL/L (ref 136–145)
SP GR UR STRIP: 1.02 (ref 1–1.03)
URN SPEC COLLECT METH UR: ABNORMAL
UROBILINOGEN UR STRIP-ACNC: NEGATIVE EU/DL
WBC # BLD AUTO: 5.12 K/UL (ref 3.9–12.7)
WBC #/AREA URNS HPF: 1 /HPF (ref 0–5)

## 2020-02-01 PROCEDURE — 96375 TX/PRO/DX INJ NEW DRUG ADDON: CPT

## 2020-02-01 PROCEDURE — 63600175 PHARM REV CODE 636 W HCPCS: Performed by: PHYSICIAN ASSISTANT

## 2020-02-01 PROCEDURE — 99284 EMERGENCY DEPT VISIT MOD MDM: CPT | Mod: 25

## 2020-02-01 PROCEDURE — 80053 COMPREHEN METABOLIC PANEL: CPT

## 2020-02-01 PROCEDURE — 81000 URINALYSIS NONAUTO W/SCOPE: CPT

## 2020-02-01 PROCEDURE — 85025 COMPLETE CBC W/AUTO DIFF WBC: CPT

## 2020-02-01 PROCEDURE — 96374 THER/PROPH/DIAG INJ IV PUSH: CPT

## 2020-02-01 PROCEDURE — 96361 HYDRATE IV INFUSION ADD-ON: CPT

## 2020-02-01 RX ORDER — GEMFIBROZIL 600 MG/1
600 TABLET, FILM COATED ORAL
COMMUNITY

## 2020-02-01 RX ORDER — KETOROLAC TROMETHAMINE 30 MG/ML
15 INJECTION, SOLUTION INTRAMUSCULAR; INTRAVENOUS
Status: COMPLETED | OUTPATIENT
Start: 2020-02-01 | End: 2020-02-01

## 2020-02-01 RX ORDER — TAMSULOSIN HYDROCHLORIDE 0.4 MG/1
0.4 CAPSULE ORAL DAILY
Qty: 10 CAPSULE | Refills: 0 | Status: SHIPPED | OUTPATIENT
Start: 2020-02-01 | End: 2020-02-11

## 2020-02-01 RX ORDER — OXYCODONE AND ACETAMINOPHEN 5; 325 MG/1; MG/1
1 TABLET ORAL EVERY 4 HOURS PRN
Qty: 10 TABLET | Refills: 0 | Status: SHIPPED | OUTPATIENT
Start: 2020-02-01

## 2020-02-01 RX ORDER — MORPHINE SULFATE 10 MG/ML
4 INJECTION INTRAMUSCULAR; INTRAVENOUS; SUBCUTANEOUS
Status: COMPLETED | OUTPATIENT
Start: 2020-02-01 | End: 2020-02-01

## 2020-02-01 RX ORDER — LISINOPRIL 2.5 MG/1
2.5 TABLET ORAL DAILY
COMMUNITY

## 2020-02-01 RX ORDER — ONDANSETRON 4 MG/1
4 TABLET, FILM COATED ORAL EVERY 12 HOURS PRN
Qty: 20 TABLET | Refills: 0 | Status: SHIPPED | OUTPATIENT
Start: 2020-02-01

## 2020-02-01 RX ORDER — ONDANSETRON 2 MG/ML
4 INJECTION INTRAMUSCULAR; INTRAVENOUS
Status: COMPLETED | OUTPATIENT
Start: 2020-02-01 | End: 2020-02-01

## 2020-02-01 RX ADMIN — KETOROLAC TROMETHAMINE 15 MG: 30 INJECTION, SOLUTION INTRAMUSCULAR at 10:02

## 2020-02-01 RX ADMIN — SODIUM CHLORIDE 1000 ML: 0.9 INJECTION, SOLUTION INTRAVENOUS at 10:02

## 2020-02-01 RX ADMIN — ONDANSETRON HYDROCHLORIDE 4 MG: 2 SOLUTION INTRAMUSCULAR; INTRAVENOUS at 10:02

## 2020-02-01 RX ADMIN — MORPHINE SULFATE 4 MG: 10 INJECTION INTRAVENOUS at 12:02

## 2020-02-01 NOTE — ED TRIAGE NOTES
Patient reports right groin pain. Also reports right flank pain, urinary frequency, nausea, chills, dry mouth. Denies dysuria, penile discharge.

## 2020-02-01 NOTE — ED PROVIDER NOTES
"Encounter Date: 2/1/2020    SCRIBE #1 NOTE: I, Onel Ruiz, am scribing for, and in the presence of,  Ehsan Ferguson PA-C. I have scribed the following portions of the note - Other sections scribed: HPI/ROS.       History     Chief Complaint   Patient presents with    Groin Pain     c/o groin and flank pain x 2 weeks that has gotten worse today and is "excrucitating".      This 44 y.o. male with a medical history of DM, HLD, and HTN presents to the ED for an emergent evaluation of R testicular pain x2 weeks. Pt notes a hx of kidney stones which is usually associated with pain to the groin area. He also notes a hx of UTIs. Pt reports urinary frequency, chills, sweats, and states "if I hold my urine, it starts to hurt." Pt reports as he was holding his urine this AM, he felt a punch to the R flank that was exacerbated after he voided. He also reports nausea and a dry mouth at this time. Of note, pt has 1 sexual partner. No concern for any STDs. No alleviating factors. No known allergies to medications. He is an occasional ETOH user, but no IV drug use. Otherwise, pt denies fever, dysuria, penile discharge, rash, vomiting, and any other associated symptoms.     The history is provided by the patient. No  was used.     Review of patient's allergies indicates:  No Known Allergies  Past Medical History:   Diagnosis Date    Diabetes mellitus     Hyperlipidemia     Hypertension     Lateral epicondylitis  of elbow 5/15/2013     Past Surgical History:   Procedure Laterality Date    BREAST SURGERY      gynecomastia surgery    KNEE ARTHROSCOPY       Family History   Problem Relation Age of Onset    Hypertension Mother     Diabetes Mother     Hypertension Father     Glaucoma Maternal Grandmother     Cataracts Maternal Grandmother     Glaucoma Maternal Grandfather     Cataracts Maternal Grandfather     Glaucoma Paternal Grandmother     Cataracts Paternal Grandmother     Glaucoma Paternal " Grandfather     Cataracts Paternal Grandfather     Amblyopia Neg Hx     Blindness Neg Hx     Cancer Neg Hx     Macular degeneration Neg Hx     Retinal detachment Neg Hx     Strabismus Neg Hx     Stroke Neg Hx     Thyroid disease Neg Hx      Social History     Tobacco Use    Smoking status: Never Smoker    Smokeless tobacco: Never Used   Substance Use Topics    Alcohol use: Yes     Comment: ETOH-occasional    Drug use: No     Review of Systems   Constitutional: Positive for chills and diaphoresis (sweaty). Negative for fever.   HENT: Negative for congestion, rhinorrhea and sore throat.         (+) dry mouth    Eyes: Negative for visual disturbance.   Respiratory: Negative for cough and shortness of breath.    Cardiovascular: Negative for chest pain.   Gastrointestinal: Positive for nausea. Negative for abdominal pain, diarrhea and vomiting.   Genitourinary: Positive for flank pain, frequency and testicular pain (R). Negative for difficulty urinating and discharge.   Musculoskeletal: Negative for neck stiffness.   Skin: Negative for rash.   Neurological: Negative for headaches.       Physical Exam     Initial Vitals [02/01/20 0937]   BP Pulse Resp Temp SpO2   126/72 75 19 98.6 °F (37 °C) 99 %      MAP       --         Physical Exam    Nursing note and vitals reviewed.  Constitutional: He appears well-developed and well-nourished. No distress.   HENT:   Head: Normocephalic and atraumatic.   Right Ear: Tympanic membrane normal.   Left Ear: Tympanic membrane normal.   Nose: Nose normal.   Mouth/Throat: Uvula is midline, oropharynx is clear and moist and mucous membranes are normal.   Eyes: EOM are normal. Pupils are equal, round, and reactive to light.   Neck: Normal range of motion. Neck supple.   Cardiovascular: Normal rate, regular rhythm and normal heart sounds. Exam reveals no gallop and no friction rub.    No murmur heard.  Pulmonary/Chest: Effort normal and breath sounds normal. No respiratory  distress. He has no wheezes. He has no rhonchi. He has no rales.   Abdominal: Soft. Bowel sounds are normal. He exhibits no mass. There is no tenderness. There is CVA tenderness (Right). There is no rigidity, no rebound, no guarding, no tenderness at McBurney's point and negative Barton's sign. Hernia confirmed negative in the right inguinal area and confirmed negative in the left inguinal area.   Genitourinary: Testes normal. Cremasteric reflex is present. Right testis shows no mass, no swelling and no tenderness. Left testis shows no mass, no swelling and no tenderness. Uncircumcised. No penile erythema. No discharge found.   Genitourinary Comments: Meera Santos RN at bedside for  examination.    Musculoskeletal: Normal range of motion.   Lymphadenopathy: No inguinal adenopathy noted on the right or left side.   Neurological: He is alert and oriented to person, place, and time. He has normal strength. No cranial nerve deficit or sensory deficit.   Skin: Skin is warm and dry. Capillary refill takes less than 2 seconds.   Psychiatric: He has a normal mood and affect.         ED Course   Procedures  Labs Reviewed   CBC W/ AUTO DIFFERENTIAL - Abnormal; Notable for the following components:       Result Value    Platelets 136 (*)     Immature Granulocytes 0.6 (*)     All other components within normal limits   COMPREHENSIVE METABOLIC PANEL - Abnormal; Notable for the following components:    Glucose 221 (*)     Anion Gap 6 (*)     All other components within normal limits   URINALYSIS, REFLEX TO URINE CULTURE - Abnormal; Notable for the following components:    Glucose, UA 1+ (*)     Occult Blood UA 3+ (*)     All other components within normal limits    Narrative:     Preferred Collection Type->Urine, Clean Catch   URINALYSIS MICROSCOPIC - Abnormal; Notable for the following components:    RBC, UA 8 (*)     All other components within normal limits    Narrative:     Preferred Collection Type->Urine, Clean Catch    POCT GLUCOSE - Abnormal; Notable for the following components:    POCT Glucose 184 (*)     All other components within normal limits          Imaging Results          CT Renal Stone Study ABD Pelvis WO (Final result)  Result time 02/01/20 11:42:21    Final result by Ole Solis MD (02/01/20 11:42:21)                 Impression:      A 2.4 mm stone within the urinary bladder.  No hydroureteronephrosis.  Hepatic steatosis and other incidental findings as above.      Electronically signed by: Ole Solis MD  Date:    02/01/2020  Time:    11:42             Narrative:    EXAMINATION:  CT RENAL STONE STUDY ABD PELVIS WO    CLINICAL HISTORY:  Flank pain, stone disease suspected;    TECHNIQUE:  Low dose axial images, sagittal and coronal reformations were obtained from the lung bases to the pubic symphysis.  Contrast was not administered.    COMPARISON:  The CT from 11/28/2017    FINDINGS:  Lung bases are clear.  Bones remain intact.    Hepatic steatosis.  No gallstones.  Unenhanced spleen, pancreas, and adrenals are unremarkable.    There is a 2.4 mm stone within the urinary bladder.  No hydronephrosis.  No nephrolithiasis.    Stomach and small bowel are normal without evidence of bowel obstruction.  Terminal ileum appears within normal limits.  No evidence of appendicitis.  No evidence of colitis or diverticulitis.  Small fat containing umbilical hernia.  Multiple small scattered shotty inguinal nodes.  No pathologically enlarged nodes are seen.  No ascites.                                 Medical Decision Making:   Differential Diagnosis:   Differential diagnosis includes not limited to:  Nephrolithiasis, obstructive uropathy, UTI, pyelonephritis, testicular torsion, orchitis, epididymitis, STI  Clinical Tests:   Lab Tests: Ordered and Reviewed  Radiological Study: Ordered and Reviewed  ED Management:  This is an evaluation of a 44 y.o. male who presents to the ED for right flank pain that radiates around to his  groin.  Vital signs are stable.   Afebrile.  Patient is nontoxic appearing and in no acute distress. There is right CVA tenderness. Abdomen is soft and nontender to palpation.   examination unremarkable. Cremasteric reflexes intact bilaterally. No inguinal lymphadenopathy.  No inguinal hernias.    CBC shows leukocytosis.  H&H stable. CMP shows no significant electrolyte abnormalities.  BUN and creatinine are stable.  Glucose 221.  In absence of anion gap, I doubt DKA at this time.  UA shows 3+ blood with 8 RBCs without pyuria.  CT shows 2.4 mm stone within the urinary bladder without hydroureteronephrosis or other obstructive uropathy.    Will discharge patient home with pain medication, Flomax and encourage follow-up with Urology.  Given absence of UTI in the presence of stone, do not believe the patient requires antibiotic therapy or hospitalization at this time.    Patient given return precautions and instructed to return to the emergency department for any new or worsening symptoms. Patient verbalized understanding and agreed with plan.             Scribe Attestation:   Scribe #1: I performed the above scribed service and the documentation accurately describes the services I performed. I attest to the accuracy of the note.                          Clinical Impression:       ICD-10-CM ICD-9-CM   1. Urinary bladder stone N21.0 594.1       Scribe Attestation: I, Ehsan Ferguson , personally performed the services described in this documentation. All medical record entries made by the scribe were at my direction and in my presence. I have reviewed the chart and agree that the record reflects my personal performance and is accurate and complete.                      Ehsan Ferguson PA-C  02/01/20 1210

## 2021-05-12 ENCOUNTER — PATIENT MESSAGE (OUTPATIENT)
Dept: RESEARCH | Facility: HOSPITAL | Age: 46
End: 2021-05-12

## 2021-06-30 PROBLEM — K21.9 GERD (GASTROESOPHAGEAL REFLUX DISEASE): Status: ACTIVE | Noted: 2021-06-30

## 2021-06-30 PROBLEM — R05.9 COUGH: Status: ACTIVE | Noted: 2021-06-30

## 2023-09-16 ENCOUNTER — HOSPITAL ENCOUNTER (EMERGENCY)
Facility: HOSPITAL | Age: 48
Discharge: HOME | End: 2023-09-16
Attending: STUDENT IN AN ORGANIZED HEALTH CARE EDUCATION/TRAINING PROGRAM
Payer: OTHER GOVERNMENT

## 2023-09-16 VITALS
HEART RATE: 87 BPM | RESPIRATION RATE: 16 BRPM | DIASTOLIC BLOOD PRESSURE: 96 MMHG | HEIGHT: 73 IN | SYSTOLIC BLOOD PRESSURE: 157 MMHG | TEMPERATURE: 98 F | WEIGHT: 253 LBS | OXYGEN SATURATION: 100 % | BODY MASS INDEX: 33.53 KG/M2

## 2023-09-16 DIAGNOSIS — U09.9 COVID-19 LONG HAULER: ICD-10-CM

## 2023-09-16 DIAGNOSIS — R07.9 CHEST PAIN: ICD-10-CM

## 2023-09-16 DIAGNOSIS — R19.7 DIARRHEA, UNSPECIFIED TYPE: ICD-10-CM

## 2023-09-16 DIAGNOSIS — R06.02 SOB (SHORTNESS OF BREATH): Primary | ICD-10-CM

## 2023-09-16 LAB
ALBUMIN SERPL BCP-MCNC: 4.1 G/DL (ref 3.5–5.2)
ALP SERPL-CCNC: 80 U/L (ref 55–135)
ALT SERPL W/O P-5'-P-CCNC: 27 U/L (ref 10–44)
ANION GAP SERPL CALC-SCNC: 12 MMOL/L (ref 8–16)
AST SERPL-CCNC: 20 U/L (ref 10–40)
BASOPHILS # BLD AUTO: 0.05 K/UL (ref 0–0.2)
BASOPHILS NFR BLD: 0.6 % (ref 0–1.9)
BILIRUB SERPL-MCNC: 0.5 MG/DL (ref 0.1–1)
BUN SERPL-MCNC: 12 MG/DL (ref 6–20)
CALCIUM SERPL-MCNC: 9.7 MG/DL (ref 8.7–10.5)
CHLORIDE SERPL-SCNC: 99 MMOL/L (ref 95–110)
CO2 SERPL-SCNC: 27 MMOL/L (ref 23–29)
CREAT SERPL-MCNC: 1.2 MG/DL (ref 0.5–1.4)
DIFFERENTIAL METHOD: ABNORMAL
EOSINOPHIL # BLD AUTO: 0.1 K/UL (ref 0–0.5)
EOSINOPHIL NFR BLD: 0.9 % (ref 0–8)
ERYTHROCYTE [DISTWIDTH] IN BLOOD BY AUTOMATED COUNT: 13.6 % (ref 11.5–14.5)
EST. GFR  (NO RACE VARIABLE): >60 ML/MIN/1.73 M^2
GLUCOSE SERPL-MCNC: 291 MG/DL (ref 70–110)
HCT VFR BLD AUTO: 41.9 % (ref 40–54)
HCV AB SERPL QL IA: NORMAL
HGB BLD-MCNC: 15.1 G/DL (ref 14–18)
HIV 1+2 AB+HIV1 P24 AG SERPL QL IA: NORMAL
IMM GRANULOCYTES # BLD AUTO: 0.06 K/UL (ref 0–0.04)
IMM GRANULOCYTES NFR BLD AUTO: 0.7 % (ref 0–0.5)
LYMPHOCYTES # BLD AUTO: 3.2 K/UL (ref 1–4.8)
LYMPHOCYTES NFR BLD: 36.4 % (ref 18–48)
MCH RBC QN AUTO: 28.2 PG (ref 27–31)
MCHC RBC AUTO-ENTMCNC: 36 G/DL (ref 32–36)
MCV RBC AUTO: 78 FL (ref 82–98)
MONOCYTES # BLD AUTO: 0.6 K/UL (ref 0.3–1)
MONOCYTES NFR BLD: 6.5 % (ref 4–15)
NEUTROPHILS # BLD AUTO: 4.9 K/UL (ref 1.8–7.7)
NEUTROPHILS NFR BLD: 54.9 % (ref 38–73)
NRBC BLD-RTO: 0 /100 WBC
PLATELET # BLD AUTO: 195 K/UL (ref 150–450)
PMV BLD AUTO: 10.3 FL (ref 9.2–12.9)
POTASSIUM SERPL-SCNC: 3.8 MMOL/L (ref 3.5–5.1)
PROT SERPL-MCNC: 7.7 G/DL (ref 6–8.4)
RBC # BLD AUTO: 5.35 M/UL (ref 4.6–6.2)
SODIUM SERPL-SCNC: 138 MMOL/L (ref 136–145)
TROPONIN I SERPL DL<=0.01 NG/ML-MCNC: 0.01 NG/ML (ref 0–0.03)
WBC # BLD AUTO: 8.89 K/UL (ref 3.9–12.7)

## 2023-09-16 PROCEDURE — 96360 HYDRATION IV INFUSION INIT: CPT

## 2023-09-16 PROCEDURE — 25000003 PHARM REV CODE 250

## 2023-09-16 PROCEDURE — 87389 HIV-1 AG W/HIV-1&-2 AB AG IA: CPT | Performed by: PHYSICIAN ASSISTANT

## 2023-09-16 PROCEDURE — 84484 ASSAY OF TROPONIN QUANT: CPT

## 2023-09-16 PROCEDURE — 85025 COMPLETE CBC W/AUTO DIFF WBC: CPT

## 2023-09-16 PROCEDURE — 93005 ELECTROCARDIOGRAM TRACING: CPT

## 2023-09-16 PROCEDURE — 99285 EMERGENCY DEPT VISIT HI MDM: CPT | Mod: 25

## 2023-09-16 PROCEDURE — 86803 HEPATITIS C AB TEST: CPT | Performed by: PHYSICIAN ASSISTANT

## 2023-09-16 PROCEDURE — 80053 COMPREHEN METABOLIC PANEL: CPT

## 2023-09-16 PROCEDURE — 93010 EKG 12-LEAD: ICD-10-PCS | Mod: ,,, | Performed by: INTERNAL MEDICINE

## 2023-09-16 PROCEDURE — 93010 ELECTROCARDIOGRAM REPORT: CPT | Mod: ,,, | Performed by: INTERNAL MEDICINE

## 2023-09-16 RX ADMIN — SODIUM CHLORIDE 1000 ML: 9 INJECTION, SOLUTION INTRAVENOUS at 04:09

## 2023-09-16 NOTE — ED PROVIDER NOTES
Encounter Date: 9/16/2023       History     Chief Complaint   Patient presents with    Shortness of Breath     Shortness of breath and chest pain . States recent covid dx. Is over quarantine but symptoms have progressed. States increased dyspnea on exertion.     HPI  Saul Bains Jr. Is a 48 y.o. M who presents to the ED for a 2 week history of chills, fatigue, intermittent generalized headache, sinus congestion, R-sided chest pain which he described as dull pressure without radiation, shortness of breath with exertion, nausea, diarrhea, and decreased p.o. intake. Patient reports that his SOB does improve slightly with use of albuterol inhaler. He states that these symptoms have been present over the past month after being diagnosed with COVID.  He currently denies fever, vomiting, abdominal pain, cough, hemoptysis.    Review of patient's allergies indicates:  No Known Allergies  Past Medical History:   Diagnosis Date    Diabetes mellitus     Hyperlipidemia     Hypertension     Lateral epicondylitis  of elbow 5/15/2013     Past Surgical History:   Procedure Laterality Date    BREAST SURGERY      gynecomastia surgery    KNEE ARTHROSCOPY       Family History   Problem Relation Age of Onset    Hypertension Mother     Diabetes Mother     Hypertension Father     Glaucoma Maternal Grandmother     Cataracts Maternal Grandmother     Glaucoma Maternal Grandfather     Cataracts Maternal Grandfather     Glaucoma Paternal Grandmother     Cataracts Paternal Grandmother     Glaucoma Paternal Grandfather     Cataracts Paternal Grandfather     Amblyopia Neg Hx     Blindness Neg Hx     Cancer Neg Hx     Macular degeneration Neg Hx     Retinal detachment Neg Hx     Strabismus Neg Hx     Stroke Neg Hx     Thyroid disease Neg Hx      Social History     Tobacco Use    Smoking status: Never    Smokeless tobacco: Never   Substance Use Topics    Alcohol use: Yes     Comment: ETOH-occasional    Drug use: No     Review of Systems  See  HPI above  Physical Exam     Initial Vitals [09/16/23 0242]   BP Pulse Resp Temp SpO2   (!) 137/92 98 (!) 26 98 °F (36.7 °C) 99 %      MAP       --         Physical Exam    Constitutional: He appears well-developed and well-nourished. He is not diaphoretic. No distress.   HENT:   Head: Normocephalic and atraumatic.   Nose: Nose normal.   Mouth/Throat: No oropharyngeal exudate.   Eyes: EOM are normal. Pupils are equal, round, and reactive to light.   Neck:   Normal range of motion.  Cardiovascular:  Normal rate, regular rhythm and normal heart sounds.           Pulmonary/Chest: Breath sounds normal. No stridor. No respiratory distress. He exhibits tenderness (mild R-sided chest tenderness over area of reported pain).   Abdominal: Abdomen is soft. Bowel sounds are normal. He exhibits no distension. There is no abdominal tenderness.   Musculoskeletal:         General: No edema. Normal range of motion.      Cervical back: Normal range of motion.     Lymphadenopathy:     He has no cervical adenopathy.   Neurological: He is alert and oriented to person, place, and time. No sensory deficit.   Skin: Skin is warm and dry. Capillary refill takes less than 2 seconds. No rash noted.   Psychiatric: He has a normal mood and affect. His behavior is normal. Judgment and thought content normal.         ED Course   Procedures  Labs Reviewed   HIV 1 / 2 ANTIBODY   HEPATITIS C ANTIBODY          Imaging Results              X-Ray Chest PA And Lateral (Final result)  Result time 09/16/23 03:33:09      Final result by Christian Ye MD (09/16/23 03:33:09)                   Impression:      Diminished depth of inspiration without radiographic evidence for superimposed acute intrathoracic process.      Electronically signed by: Christian Ye  Date:    09/16/2023  Time:    03:33               Narrative:    EXAMINATION:  XR CHEST PA AND LATERAL    CLINICAL HISTORY:  Shortness of breath    TECHNIQUE:  PA and lateral views of the chest  were performed.    COMPARISON:  Chest radiograph June 30, 2021    FINDINGS:  Two views of the chest are submitted.  There is diminished depth of inspiration.  When accounting for position and technique and depth of inspiration, the appearance of the cardiomediastinal silhouette is appropriate.    Accentuation of pulmonary bronchovascular markings consistent with diminished depth of inspiration noted.  There is no evidence for confluent infiltrate or consolidation, significant pleural effusion or pneumothorax.    The osseous structures demonstrate chronic change.                                       Medications - No data to display  Medical Decision Making  Differential diagnosis includes but is not limited to:  COVID sequelae, ACS, pneumonia, PE, pneumothorax, pulmonary edema/CHF, pericarditis, intra-abdominal process.     - EKG and history do not support the diagnosis of ACS.   - There is no evidence of pneumothorax or infiltrate on CXR.   - Aortic dissection considered, but presenting symptoms felt uncharacteristic, chest X-ray shows no evidence of mediastinal widening and there are strong, equal and symmetric pulses. Given the current presentation, aortic dissection is felt unlikely.  - History, CXR, and exam do not suggest pulmonary edema/congestive heart failure   - Pulmonary embolism was considered but felt unlikely due to the compendium of presenting elements leading to a low pre-test probability followed by a negative PERC rule. All 8 of the rule out PERC criteria were present including: Age<50, HR <100, O2 sat > 94%, no recent trauma/surgery, no hemoptysis, no exogenous hormone use, no clinical signs suggestive of DVT, no hx DVT/PE. Given the above, there is a <2% risk of PE and I feel that no further diagnostic testing is needed.  - Intra-abdominal pathology felt unlikely given benign/non tender abdominal exam.   - Acute coronary syndrome considered but there are negative biomarkers, no acute ischemic EKG  changes, and the patient has a low HEART score. Based on this, I feel that there is low risk for short-term major adverse cardiac event.   -based on my evaluation, patient's symptoms are most likely due to COVID-19 sequelae.    I have discussed this with the patient and reviewed options for inpatient and outpatient management. The patient verbalizes an excellent understanding of the above including presence of small risk of short-term major adverse cardiac event even in the setting of low HEART score, negative cardiac biomarker, and compendium of elements of this presentation. The patient wishes to pursue further workup on as an outpatient.     Amount and/or Complexity of Data Reviewed  External Data Reviewed: notes.     Details: X-ray without radiographic evidence for superimposed acute intrathoracic process.  Labs: ordered. Decision-making details documented in ED Course.     Details: Largely within normal limits.  No significant concern for infectious process or ischemia.  Radiology: ordered. Decision-making details documented in ED Course.  ECG/medicine tests: ordered and independent interpretation performed.     Details: EKG: T-wave inversion in inferior leads. Normal rate, normal rhythm, normal intervals, normal QRS duration. No previous EKG to compare results.  This EKG is likely benign, and not due to ischemic process as there are no clinical signs of ACS and troponin is within normal limits.               ED Course as of 09/16/23 0745   Sat Sep 16, 2023   0721 WBC: 8.89  Decreased concerns for infectious process [RN]   0722 Comprehensive metabolic panel(!)  Electrolytes WNL. [RN]   0722 X-Ray Chest PA And Lateral  without radiographic evidence for superimposed acute intrathoracic process. [RN]      ED Course User Index  [RN] Ambrocio Stoddard MD                    Clinical Impression:   Final diagnoses:  [R07.9] Chest pain  [R06.02] SOB (shortness of breath)               Ambrocio Stoddard,  MD  Resident  09/16/23 0879

## 2023-09-16 NOTE — DISCHARGE INSTRUCTIONS
PLEASE FOLLOW UP WITH YOUR PCP TO DISCUSS TODAY'S VISIT    WE HAVE PLACED A REFERRAL TO PULMONOLOGY CLINIC, THEY WILL CONTACT YOU TO SCHEDULE AN APPOINTMENT    PLEASE RETURN TO THE ED IF YOUR SYMPTOMS PERSIST, WORSEN, OR YOU HAVE ANY OTHER ACUTE CONCERNS ABOUT YOUR HEALTH.     Please see additional discharge instructions attached for more information.     Thank you!

## 2023-09-16 NOTE — ED NOTES
Saul Bains Jr., a 48 y.o. male presents to the ED w/ complaint of chest pain x 1 month. He had covid and was feeling short of breath a month ago but thought it was just the covid. The shortness of breath has continued and seems to come in episodes.      Triage note:  Chief Complaint   Patient presents with    Shortness of Breath     Shortness of breath and chest pain . States recent covid dx. Is over quarantine but symptoms have progressed. States increased dyspnea on exertion.     Review of patient's allergies indicates:  No Known Allergies  Past Medical History:   Diagnosis Date    Diabetes mellitus     Hyperlipidemia     Hypertension     Lateral epicondylitis  of elbow 5/15/2013         LOC: The patient is awake, alert, and oriented to self, place, time, and situation. Pt is calm and cooperative. Affect is appropriate.  Speech is appropriate and clear.     APPEARANCE: Patient resting comfortably in no acute distress.  Patient is clean and well groomed.    SKIN: The skin is warm and dry; color consistent with ethnicity.  Patient has normal skin turgor and moist mucus membranes.  Skin intact; no breakdown or bruising noted.     MUSCULOSKELETAL: Patient moving upper and lower extremities without difficulty; denies pain in the extremities or back.  Denies weakness.     RESPIRATORY: Reports episodes of shortness of breath and chest tightness. Airway is open and patent. Respirations spontaneous, even, easy, and non-labored.  No audible wheeze. Patient has a normal effort and rate.  No accessory muscle use noted. Denies cough.     CARDIAC:  Reports chest pain, worse with breathing and moving trunk. Normal rate noted.  No peripheral edema noted. 2+ radial pulses bilaterally.       ABDOMEN:    No distention noted. Pt denies abdominal pain; denies nausea, vomiting, diarrhea, or constipation.    NEUROLOGIC: Eyes open spontaneously.  Behavior appropriate to situation.  Follows commands; facial expression symmetrical.   Purposeful motor response noted; normal sensation in all extremities. Pt denies headache; denies lightheadedness or dizziness; denies visual disturbances; denies loss of balance; denies unilateral weakness.

## 2024-02-17 ENCOUNTER — HOSPITAL ENCOUNTER (EMERGENCY)
Facility: HOSPITAL | Age: 49
Discharge: HOME | End: 2024-02-17
Attending: EMERGENCY MEDICINE
Payer: OTHER GOVERNMENT

## 2024-02-17 VITALS
SYSTOLIC BLOOD PRESSURE: 148 MMHG | BODY MASS INDEX: 33.13 KG/M2 | HEART RATE: 97 BPM | RESPIRATION RATE: 18 BRPM | HEIGHT: 73 IN | WEIGHT: 250 LBS | TEMPERATURE: 98 F | OXYGEN SATURATION: 96 % | DIASTOLIC BLOOD PRESSURE: 93 MMHG

## 2024-02-17 DIAGNOSIS — S16.1XXA CERVICAL STRAIN, ACUTE, INITIAL ENCOUNTER: Primary | ICD-10-CM

## 2024-02-17 PROCEDURE — 25000003 PHARM REV CODE 250: Performed by: EMERGENCY MEDICINE

## 2024-02-17 PROCEDURE — 20552 NJX 1/MLT TRIGGER POINT 1/2: CPT

## 2024-02-17 PROCEDURE — 99284 EMERGENCY DEPT VISIT MOD MDM: CPT | Mod: 25

## 2024-02-17 PROCEDURE — 63600175 PHARM REV CODE 636 W HCPCS: Performed by: EMERGENCY MEDICINE

## 2024-02-17 RX ORDER — MELOXICAM 15 MG/1
15 TABLET ORAL DAILY PRN
Qty: 30 TABLET | Refills: 0 | Status: SHIPPED | OUTPATIENT
Start: 2024-02-17 | End: 2024-03-18

## 2024-02-17 RX ORDER — LIDOCAINE HYDROCHLORIDE 10 MG/ML
5 INJECTION, SOLUTION EPIDURAL; INFILTRATION; INTRACAUDAL; PERINEURAL
Status: COMPLETED | OUTPATIENT
Start: 2024-02-17 | End: 2024-02-17

## 2024-02-17 RX ORDER — DEXAMETHASONE SODIUM PHOSPHATE 100 MG/10ML
10 INJECTION INTRAMUSCULAR; INTRAVENOUS
Status: COMPLETED | OUTPATIENT
Start: 2024-02-17 | End: 2024-02-17

## 2024-02-17 RX ORDER — BUPIVACAINE HYDROCHLORIDE 5 MG/ML
5 INJECTION, SOLUTION EPIDURAL; INTRACAUDAL
Status: COMPLETED | OUTPATIENT
Start: 2024-02-17 | End: 2024-02-17

## 2024-02-17 RX ADMIN — BUPIVACAINE HYDROCHLORIDE 25 MG: 5 INJECTION, SOLUTION EPIDURAL; INTRACAUDAL; PERINEURAL at 12:02

## 2024-02-17 RX ADMIN — LIDOCAINE HYDROCHLORIDE 50 MG: 10 INJECTION, SOLUTION EPIDURAL; INFILTRATION; INTRACAUDAL at 12:02

## 2024-02-17 RX ADMIN — DEXAMETHASONE SODIUM PHOSPHATE 10 MG: 10 INJECTION INTRAMUSCULAR; INTRAVENOUS at 12:02

## 2024-02-17 NOTE — ED TRIAGE NOTES
Saul Bains , a 48 y.o. male presents to the ED w/ complaints of right sided neck pain for the past x3 days, states that it is painful and sensitive to touch, hurts when leaning head to the right side, denies any other symptoms at this time.     Triage note:  Chief Complaint   Patient presents with    Neck Pain     Pain and sensitive to touch r side of neck, started 3 d ago     Review of patient's allergies indicates:  No Known Allergies  Past Medical History:   Diagnosis Date    Diabetes mellitus     Hyperlipidemia     Hypertension     Lateral epicondylitis  of elbow 5/15/2013       APPEARANCE: awake and alert in NAD.  SKIN: warm, dry and intact. No breakdown or bruising.  MUSCULOSKELETAL: Patient moving all extremities spontaneously, no obvious swelling or deformities noted. Ambulates independently. + Right neck pain   RESPIRATORY: Denies shortness of breath.Respirations unlabored.   CARDIAC: Denies CP, 2+ distal pulses; no peripheral edema  ABDOMEN: S/ND/NT, Denies nausea  : voids spontaneously, denies difficulty  Neurologic: AAO x 4; follows commands equal strength in all extremities; denies numbness/tingling. Denies dizziness

## 2024-02-17 NOTE — ED PROVIDER NOTES
Encounter Date: 2/17/2024       History     Chief Complaint   Patient presents with    Neck Pain     Pain and sensitive to touch r side of neck, started 3 d ago     47yo male with a past medical history of diabetes mellitus and hypertension presents complaining of right-sided neck pain for the past 3 days that has not responded to Flexeril or Soma.  Patient reports that he has been increasing his workouts recently post right shoulder surgery and believes he strained his neck.  Patient reports pain with range of motion laterally.  Patient denies any fevers or chills.    The history is provided by the patient and the spouse.     Review of patient's allergies indicates:  No Known Allergies  Past Medical History:   Diagnosis Date    Diabetes mellitus     Hyperlipidemia     Hypertension     Lateral epicondylitis  of elbow 5/15/2013     Past Surgical History:   Procedure Laterality Date    BREAST SURGERY      gynecomastia surgery    KNEE ARTHROSCOPY       Family History   Problem Relation Age of Onset    Hypertension Mother     Diabetes Mother     Hypertension Father     Glaucoma Maternal Grandmother     Cataracts Maternal Grandmother     Glaucoma Maternal Grandfather     Cataracts Maternal Grandfather     Glaucoma Paternal Grandmother     Cataracts Paternal Grandmother     Glaucoma Paternal Grandfather     Cataracts Paternal Grandfather     Amblyopia Neg Hx     Blindness Neg Hx     Cancer Neg Hx     Macular degeneration Neg Hx     Retinal detachment Neg Hx     Strabismus Neg Hx     Stroke Neg Hx     Thyroid disease Neg Hx      Social History     Tobacco Use    Smoking status: Never    Smokeless tobacco: Never   Substance Use Topics    Alcohol use: Yes     Comment: ETOH-occasional    Drug use: No     Review of Systems   Constitutional:  Negative for chills and fever.   Musculoskeletal:  Positive for myalgias.   All other systems reviewed and are negative.      Physical Exam     Initial Vitals [02/17/24 1055]   BP Pulse  Resp Temp SpO2   (!) 205/100 92 18 98.1 °F (36.7 °C) 96 %      MAP       --         Physical Exam    Nursing note and vitals reviewed.  Constitutional: He appears well-developed and well-nourished. He is not diaphoretic. No distress.   HENT:   Head: Normocephalic and atraumatic.   Right Ear: External ear normal.   Left Ear: External ear normal.   Eyes: EOM are normal. Pupils are equal, round, and reactive to light.   Neck: Neck supple.   Tenderness to palpation along right SCM and trapezius muscles, spasms noted.  Pain reproduced with range of motion of SCM and trapezius.  No masses noted.  Full range of motion extension and flexion.  No tenderness to palpation of C-spine   Normal range of motion.  Cardiovascular:  Normal rate, normal heart sounds and intact distal pulses.           Pulmonary/Chest: No respiratory distress.   Abdominal: There is no abdominal tenderness.   Musculoskeletal:         General: Normal range of motion.      Cervical back: Normal range of motion and neck supple.     Neurological: He is alert and oriented to person, place, and time.   Skin: Skin is warm.   Psychiatric: He has a normal mood and affect. Thought content normal.         ED Course   ED [facility] [place]    Date/Time: 2/17/2024 11:54 AM    Performed by: Filiberto Emery MD  Authorized by: Filiberto Emery MD    A focused ultrasound was performed for the following indication:  Right-sided neck pain  Identified Structures:  Right neck muscles  Findings:  No masses noted  Impression:  Muscle strain  Charge?:  No (educational)  Trigger Point Injection    Performed by: Filiberto Emery MD  Authorized by: Filiberto Emery MD    Trapezus:  Right  Sternocleidomastoid:  Right  INFORMED CONSENT: The procedure, risks, benefits and options were discussed with patient. There are no contraindications to the procedure. The patient expressed understanding and agreed to proceed. The personnel performing the procedure was discussed. I verify that I  personally obtained Saul's consent prior to the start of the procedure and the signed consent can be found on the patient's chart.  PROCEDURE: TRIGGER POINT INJECTION  The patient was placed in a seated position and time out was perfomed. The site of pain and procedure were confirmed with the patient prior to starting the procedure. After performing time out. The patient's  trigger points were identified and marked . The skin was prepped with chlorhexidine three times.   After performing time out A 27-gauge 1.5 inch  needle was advanced through the skin and subcutaneous tissues.  Aspiration for blood, air and CSF was negative.  A total of 5 ml of Bupivacaine 0.25% and 10 mg Decadron was injected at all trigger points.  No complications were evident. No specimens collected.    Blood Loss: Nill  Specimen: None    Labs Reviewed - No data to display       Imaging Results    None          Medications   LIDOcaine (PF) 10 mg/ml (1%) injection 50 mg (50 mg Infiltration Given 2/17/24 1225)   dexAMETHasone injection 10 mg (10 mg Other Given 2/17/24 1240)   BUPivacaine (PF) 0.5% (5 mg/mL) injection 25 mg (25 mg Subcutaneous Given 2/17/24 1224)     Medical Decision Making  Right-sided neck pain differential diagnosis includes cervical radiculopathy, muscle tear, muscle strain, muscle spasm, torticollis    Amount and/or Complexity of Data Reviewed  Discussion of management or test interpretation with external provider(s): Encourage patient to take his home muscle relaxers to help aid with his symptoms.  Patient discharged home in stable condition. Diagnosis and treatment plan explained to patient. No further workup indicated based on their complaints or examination today. Discussed results with the patient. I educated the patient/guardian on the warning signs and symptoms for which they must seek immediate medical attention. All questions addressed and patient/guardian were given discharge instructions and followup  information.     Risk  Prescription drug management.                                      Clinical Impression:  Final diagnoses:  [S16.1XXA] Cervical strain, acute, initial encounter (Primary)          ED Disposition Condition    Discharge Stable          ED Prescriptions       Medication Sig Dispense Start Date End Date Auth. Provider    meloxicam (MOBIC) 15 MG tablet Take 1 tablet (15 mg total) by mouth daily as needed for Pain. 30 tablet 2/17/2024 3/18/2024 Filiberto Emery MD          Follow-up Information       Follow up With Specialties Details Why Contact Info    Your Primary Care Provider  Schedule an appointment as soon as possible for a visit in 3 days For follow-up and re-evaluation     [facility] - Emergency Dept Emergency Medicine  As needed, for any new or worsening symptoms [address] [facility] [address] [phone]             Filiberto Emery MD  02/17/24 5230

## 2024-09-23 ENCOUNTER — HOSPITAL ENCOUNTER (EMERGENCY)
Facility: HOSPITAL | Age: 49
Discharge: HOME | End: 2024-09-23
Attending: EMERGENCY MEDICINE
Payer: OTHER GOVERNMENT

## 2024-09-23 VITALS
DIASTOLIC BLOOD PRESSURE: 99 MMHG | RESPIRATION RATE: 18 BRPM | OXYGEN SATURATION: 98 % | WEIGHT: 250 LBS | HEIGHT: 73 IN | HEART RATE: 99 BPM | BODY MASS INDEX: 33.13 KG/M2 | TEMPERATURE: 98 F | SYSTOLIC BLOOD PRESSURE: 131 MMHG

## 2024-09-23 DIAGNOSIS — R73.9 HYPERGLYCEMIA: ICD-10-CM

## 2024-09-23 DIAGNOSIS — E11.65 TYPE 2 DIABETES MELLITUS WITH HYPERGLYCEMIA, WITHOUT LONG-TERM CURRENT USE OF INSULIN: Primary | ICD-10-CM

## 2024-09-23 LAB
ALBUMIN SERPL BCP-MCNC: 4.2 G/DL (ref 3.5–5.2)
ALLENS TEST: ABNORMAL
ALP SERPL-CCNC: 101 U/L (ref 55–135)
ALT SERPL W/O P-5'-P-CCNC: 27 U/L (ref 10–44)
ANION GAP SERPL CALC-SCNC: 8 MMOL/L (ref 8–16)
AST SERPL-CCNC: 15 U/L (ref 10–40)
B-OH-BUTYR BLD STRIP-SCNC: 0.3 MMOL/L (ref 0–0.5)
BACTERIA #/AREA URNS AUTO: NORMAL /HPF
BASOPHILS # BLD AUTO: 0.04 K/UL (ref 0–0.2)
BASOPHILS NFR BLD: 0.4 % (ref 0–1.9)
BILIRUB SERPL-MCNC: 0.7 MG/DL (ref 0.1–1)
BILIRUB UR QL STRIP: NEGATIVE
BUN SERPL-MCNC: 14 MG/DL (ref 6–20)
CALCIUM SERPL-MCNC: 10.3 MG/DL (ref 8.7–10.5)
CHLORIDE SERPL-SCNC: 100 MMOL/L (ref 95–110)
CLARITY UR REFRACT.AUTO: CLEAR
CO2 SERPL-SCNC: 28 MMOL/L (ref 23–29)
COLOR UR AUTO: YELLOW
CREAT SERPL-MCNC: 1.4 MG/DL (ref 0.5–1.4)
DIFFERENTIAL METHOD BLD: ABNORMAL
EOSINOPHIL # BLD AUTO: 0.1 K/UL (ref 0–0.5)
EOSINOPHIL NFR BLD: 1 % (ref 0–8)
ERYTHROCYTE [DISTWIDTH] IN BLOOD BY AUTOMATED COUNT: 13.3 % (ref 11.5–14.5)
EST. GFR  (NO RACE VARIABLE): >60 ML/MIN/1.73 M^2
GLUCOSE SERPL-MCNC: 324 MG/DL (ref 70–110)
GLUCOSE UR QL STRIP: ABNORMAL
HCO3 UR-SCNC: 29.2 MMOL/L (ref 24–28)
HCT VFR BLD AUTO: 44.7 % (ref 40–54)
HCV AB SERPL QL IA: NORMAL
HGB BLD-MCNC: 15.3 G/DL (ref 14–18)
HGB UR QL STRIP: NEGATIVE
HIV 1+2 AB+HIV1 P24 AG SERPL QL IA: NORMAL
HYALINE CASTS UR QL AUTO: 1 /LPF
IMM GRANULOCYTES # BLD AUTO: 0.03 K/UL (ref 0–0.04)
IMM GRANULOCYTES NFR BLD AUTO: 0.3 % (ref 0–0.5)
KETONES UR QL STRIP: ABNORMAL
LEUKOCYTE ESTERASE UR QL STRIP: NEGATIVE
LYMPHOCYTES # BLD AUTO: 4.1 K/UL (ref 1–4.8)
LYMPHOCYTES NFR BLD: 40 % (ref 18–48)
MCH RBC QN AUTO: 27.4 PG (ref 27–31)
MCHC RBC AUTO-ENTMCNC: 34.2 G/DL (ref 32–36)
MCV RBC AUTO: 80 FL (ref 82–98)
MICROSCOPIC COMMENT: NORMAL
MONOCYTES # BLD AUTO: 0.7 K/UL (ref 0.3–1)
MONOCYTES NFR BLD: 6.4 % (ref 4–15)
NEUTROPHILS # BLD AUTO: 5.3 K/UL (ref 1.8–7.7)
NEUTROPHILS NFR BLD: 51.9 % (ref 38–73)
NITRITE UR QL STRIP: NEGATIVE
NRBC BLD-RTO: 0 /100 WBC
PCO2 BLDA: 49.8 MMHG (ref 35–45)
PH SMN: 7.38 [PH] (ref 7.35–7.45)
PH UR STRIP: 5 [PH] (ref 5–8)
PLATELET # BLD AUTO: 220 K/UL (ref 150–450)
PMV BLD AUTO: 10.5 FL (ref 9.2–12.9)
PO2 BLDA: 30 MMHG (ref 40–60)
POC BE: 4 MMOL/L
POC SATURATED O2: 54 % (ref 95–100)
POC TCO2: 31 MMOL/L (ref 24–29)
POCT GLUCOSE: 318 MG/DL (ref 70–110)
POTASSIUM SERPL-SCNC: 3.8 MMOL/L (ref 3.5–5.1)
PROT SERPL-MCNC: 7.9 G/DL (ref 6–8.4)
PROT UR QL STRIP: ABNORMAL
RBC # BLD AUTO: 5.59 M/UL (ref 4.6–6.2)
RBC #/AREA URNS AUTO: 1 /HPF (ref 0–4)
SAMPLE: ABNORMAL
SITE: ABNORMAL
SODIUM SERPL-SCNC: 136 MMOL/L (ref 136–145)
SP GR UR STRIP: >=1.03 (ref 1–1.03)
SQUAMOUS #/AREA URNS AUTO: 0 /HPF
URN SPEC COLLECT METH UR: ABNORMAL
WBC # BLD AUTO: 10.18 K/UL (ref 3.9–12.7)
WBC #/AREA URNS AUTO: 1 /HPF (ref 0–5)
YEAST UR QL AUTO: NORMAL

## 2024-09-23 PROCEDURE — 80047 BASIC METABLC PNL IONIZED CA: CPT

## 2024-09-23 PROCEDURE — 85025 COMPLETE CBC W/AUTO DIFF WBC: CPT | Performed by: STUDENT IN AN ORGANIZED HEALTH CARE EDUCATION/TRAINING PROGRAM

## 2024-09-23 PROCEDURE — 87389 HIV-1 AG W/HIV-1&-2 AB AG IA: CPT | Performed by: PHYSICIAN ASSISTANT

## 2024-09-23 PROCEDURE — 82010 KETONE BODYS QUAN: CPT | Performed by: STUDENT IN AN ORGANIZED HEALTH CARE EDUCATION/TRAINING PROGRAM

## 2024-09-23 PROCEDURE — 94761 N-INVAS EAR/PLS OXIMETRY MLT: CPT | Mod: XB

## 2024-09-23 PROCEDURE — 99284 EMERGENCY DEPT VISIT MOD MDM: CPT

## 2024-09-23 PROCEDURE — 82803 BLOOD GASES ANY COMBINATION: CPT

## 2024-09-23 PROCEDURE — 82962 GLUCOSE BLOOD TEST: CPT

## 2024-09-23 PROCEDURE — 86803 HEPATITIS C AB TEST: CPT | Performed by: PHYSICIAN ASSISTANT

## 2024-09-23 PROCEDURE — 81001 URINALYSIS AUTO W/SCOPE: CPT | Performed by: STUDENT IN AN ORGANIZED HEALTH CARE EDUCATION/TRAINING PROGRAM

## 2024-09-23 PROCEDURE — 80053 COMPREHEN METABOLIC PANEL: CPT | Performed by: STUDENT IN AN ORGANIZED HEALTH CARE EDUCATION/TRAINING PROGRAM

## 2024-09-23 PROCEDURE — 99900035 HC TECH TIME PER 15 MIN (STAT)

## 2024-09-24 LAB — POCT GLUCOSE: 286 MG/DL (ref 70–110)

## 2024-09-24 NOTE — FIRST PROVIDER EVALUATION
"Medical screening examination initiated.  I have conducted a focused provider triage encounter, findings are as follows:    Brief history of present illness:  Reports his blood sugar has been >400. States he has been taking his Trulicity and metformin as prescribed. Reports increased urination, nausea.    Also reports a recent R ear infection.     Vitals:    09/23/24 1910   BP: (!) 131/99   BP Location: Right arm   Pulse: 99   Resp: 18   Temp: 98.2 °F (36.8 °C)   TempSrc: Oral   SpO2: 98%   Weight: 113.4 kg (250 lb)   Height: 6' 1" (1.854 m)       Pertinent physical exam:  Ambulatory, no acute distress, alert and oriented.     POC glucose in triage: 318    Brief workup plan:  ECG, labs    Preliminary workup initiated; this workup will be continued and followed by the physician or advanced practice provider that is assigned to the patient when roomed.  "

## 2024-09-24 NOTE — ED PROVIDER NOTES
Chief complaint:  Hyperglycemia (Presents to the ED with complaints of elevated blood sugar, states that he has T2DM and feels that his sugar may be elevated, + abdominal pain, + N/V, also has cramps in right and left leg. )      HPI:  Saul Bains Jr. is a 49 y.o. male presenting with concern for hyperglycemia.  Patient is diabetic currently on metformin and Trulicity here with elevated blood sugar readings, greater than 400.  Patient tried taking additional water intake but given persistent readings in the 300s presented to the emergency department.  Patient does note some Charley horses to the calves.  Patient denies being on insulin in the past.  Currently taking antibiotics for an ear infection.    ROS: As per HPI and below:    Constitutional: - fever, -chills.  Eyes: No discharge. No pain.  HENT: no nasal congestion, -anosmia; No sore throat.   Cardiovascular: - chest pain, no palpitations.  Respiratory: -cough, -shortness of breath.  Gastrointestinal: -nausea, no diarrhea, - abdominal pain, -vomiting.   Genitourinary: No hematuria, dysuria, urgency.  Increased frequency  Musculoskeletal: No back pain.  Skin: No rashes, - lesions.  Neurological: -headache, no focal weakness.  Cramping to calves    Review of patient's allergies indicates:  No Known Allergies    No current facility-administered medications on file prior to encounter.     Current Outpatient Medications on File Prior to Encounter   Medication Sig Dispense Refill    beclomethasone (QVAR) 40 mcg/actuation Aero Inhale 1 puff into the lungs 2 (two) times a day. Controller 8.7 g 3    cyclobenzaprine (FLEXERIL) 10 MG tablet Take 1 tablet (10 mg total) by mouth every evening. 30 tablet 2    fluoxetine (PROZAC) 40 MG capsule Take 1 capsule (40 mg total) by mouth once daily. 30 capsule 3    gabapentin (NEURONTIN) 300 MG capsule Take 1 capsule (300 mg total) by mouth 3 (three) times daily. 90 capsule 0    gemfibrozil (LOPID) 600 MG tablet Take 600  mg by mouth 2 (two) times daily before meals.      lisinopril (PRINIVIL,ZESTRIL) 2.5 MG tablet Take 2.5 mg by mouth once daily.      metFORMIN (FORTAMET) 500 mg 24 hr tablet Take 500 mg by mouth daily with breakfast.      nystatin (MYCOSTATIN) cream 1 Cream Topical Twice a day      omega-3 fatty acids-vitamin E (FISH OIL) 1,000 mg Cap Take by mouth. 1 Capsule Oral Three times a day.  With meals      ondansetron (ZOFRAN) 4 MG tablet Take 1 tablet (4 mg total) by mouth every 12 (twelve) hours as needed for Nausea. 20 tablet 0    ondansetron (ZOFRAN-ODT) 4 MG TbDL Take 2 tablets (8 mg total) by mouth every 8 (eight) hours as needed. 12 tablet 1    oxyCODONE-acetaminophen (PERCOCET) 5-325 mg per tablet Take 1 tablet by mouth every 4 (four) hours as needed for Pain. 10 tablet 0    pantoprazole (PROTONIX) 40 MG tablet Take 1 tablet (40 mg total) by mouth once daily. 30 tablet 3    tamsulosin (FLOMAX) 0.4 mg Cap Take 1 capsule (0.4 mg total) by mouth once daily. for 10 days 10 capsule 0    zolpidem 12.5 MG ORAL CR TBMP (AMBIEN CR) 12.5 MG CR tablet Take 1 tablet (12.5 mg total) by mouth nightly as needed. 30 tablet 0       PMH:  As per HPI and below:  Past Medical History:   Diagnosis Date    Diabetes mellitus     Hyperlipidemia     Hypertension     Lateral epicondylitis  of elbow 5/15/2013     Past Surgical History:   Procedure Laterality Date    BREAST SURGERY      gynecomastia surgery    KNEE ARTHROSCOPY         Social History     Socioeconomic History    Marital status:    Tobacco Use    Smoking status: Never    Smokeless tobacco: Never   Substance and Sexual Activity    Alcohol use: Yes     Comment: ETOH-occasional    Drug use: No    Sexual activity: Yes     Partners: Female     Birth control/protection: None     Social Determinants of Health      [facility]       Family History   Problem Relation Name Age of Onset    Hypertension Mother      Diabetes Mother       Hypertension Father      Glaucoma Maternal Grandmother      Cataracts Maternal Grandmother      Glaucoma Maternal Grandfather      Cataracts Maternal Grandfather      Glaucoma Paternal Grandmother      Cataracts Paternal Grandmother      Glaucoma Paternal Grandfather      Cataracts Paternal Grandfather      Amblyopia Neg Hx      Blindness Neg Hx      Cancer Neg Hx      Macular degeneration Neg Hx      Retinal detachment Neg Hx      Strabismus Neg Hx      Stroke Neg Hx      Thyroid disease Neg Hx         Physical Exam:    Vitals:    09/23/24 1910   BP: (!) 131/99   Pulse: 99   Resp: 18   Temp: 98.2 °F (36.8 °C)         General Appearance: The patient is alert, has no immediate or signs of toxicity. No acute distress.    HEENT:  Extra ocular movements intact. No drainage.   Neck: No stridor.   Respiratory: No increased work of breathing, speaking in full sentences  CV: nml perfusion, no elevated JVD  Gastrointestinal:   No guarding  Neurological: Alert and appropriate, moving all extremities spontaneously  Skin: Warm and dry, no rashes.  Musculoskeletal: Extremities without notable edema, appropriate ROM    Psych: Normal affect, no evidence of psychosis    Labs Reviewed   POCT GLUCOSE - Abnormal       Result Value    POCT Glucose 318 (*)    ISTAT PROCEDURE - Abnormal    POC PH 7.376      POC PCO2 49.8 (*)     POC PO2 30 (*)     POC HCO3 29.2 (*)     POC BE 4 (*)     POC SATURATED O2 54      POC TCO2 31 (*)     Sample VENOUS      Site Other      Allens Test N/A     CBC W/ AUTO DIFFERENTIAL   COMPREHENSIVE METABOLIC PANEL   BETA - HYDROXYBUTYRATE, SERUM   URINALYSIS, REFLEX TO URINE CULTURE   HIV 1 / 2 ANTIBODY   HEPATITIS C ANTIBODY   POCT GLUCOSE MONITORING CONTINUOUS   ISTAT CHEM8         MDM:    49 y.o. male with type 2 diabetes not on insulin, metformin and Trulicity only here with concern for hyperglycemia.  Patient denies history of DKA in the past.  Patient tried drinking water prior to arrival but was  encouraged to come to the emergency department by nursing line.  Currently patient is not desiring to stay for additional workup, not desiring to await further lab work, as he was only concerned about possibly getting insulin.  Labs here show initial VBG without acidosis, glucose 310s.  Discussed with patient plan for further workup of possible electrolyte disturbances, but not desiring to await these results.  Discussed need for follow-up or return if worsening dizziness, seizure, thirst, any other concerns.    Medications - No data to display    ED Course as of 09/23/24 2043   Mon Sep 23, 2024   2040 POCT Glucose(!): 318 [DM]   2042 POC PH: 7.376 [DM]      ED Course User Index  [DM] Diana Tanner MD       Note was created using voice recognition software. Note may have occasional typographical or grammatical errors, garbled syntax, and other bizarre constructions that may not have been identified and edited despite good justin initial review prior to signing.     Diagnoses:    1. Type 2 diabetes mellitus with hyperglycemia, without long-term current use of insulin    2. Hyperglycemia                 Diana Tanner MD  09/23/24 2045

## 2024-09-24 NOTE — ED TRIAGE NOTES
PT arrives to ED with complaints of Blood sugar in the 300s since this morning.  PT  hx of diabetes and said he has been urinating constantly, was sweating last night , and has been getting cramps in leg. PT also endorses Nausea and Vomiting.